# Patient Record
Sex: FEMALE | Race: WHITE | NOT HISPANIC OR LATINO | Employment: FULL TIME | URBAN - METROPOLITAN AREA
[De-identification: names, ages, dates, MRNs, and addresses within clinical notes are randomized per-mention and may not be internally consistent; named-entity substitution may affect disease eponyms.]

---

## 2017-01-10 ENCOUNTER — LAB REQUISITION (OUTPATIENT)
Dept: LAB | Facility: HOSPITAL | Age: 27
End: 2017-01-10

## 2017-01-10 DIAGNOSIS — J02.9 ACUTE PHARYNGITIS: ICD-10-CM

## 2017-01-10 LAB — S PYO AG THROAT QL: POSITIVE

## 2017-01-10 PROCEDURE — 87880 STREP A ASSAY W/OPTIC: CPT | Performed by: INTERNAL MEDICINE

## 2017-01-27 ENCOUNTER — LAB (OUTPATIENT)
Dept: LAB | Facility: HOSPITAL | Age: 27
End: 2017-01-27

## 2017-01-27 ENCOUNTER — TRANSCRIBE ORDERS (OUTPATIENT)
Dept: LAB | Facility: HOSPITAL | Age: 27
End: 2017-01-27

## 2017-01-27 DIAGNOSIS — Z01.818 PRE-OP EVALUATION: Primary | ICD-10-CM

## 2017-01-27 DIAGNOSIS — Z01.818 PRE-OP EVALUATION: ICD-10-CM

## 2017-01-27 LAB
APTT PPP: 32.1 SECONDS (ref 24–31)
BLEEDING TIME PATIENT: 7 MINUTES (ref 2–8)
DEPRECATED RDW RBC AUTO: 40.2 FL (ref 37–54)
ERYTHROCYTE [DISTWIDTH] IN BLOOD BY AUTOMATED COUNT: 12.6 % (ref 11.3–14.5)
HCT VFR BLD AUTO: 39.9 % (ref 34.5–44)
HGB BLD-MCNC: 13.6 G/DL (ref 11.5–15.5)
INR PPP: 0.96
MCH RBC QN AUTO: 30.2 PG (ref 27–31)
MCHC RBC AUTO-ENTMCNC: 34.1 G/DL (ref 32–36)
MCV RBC AUTO: 88.5 FL (ref 80–99)
PLATELET # BLD AUTO: 208 10*3/MM3 (ref 150–450)
PMV BLD AUTO: 13.1 FL (ref 6–12)
PROTHROMBIN TIME: 10.5 SECONDS (ref 9.6–11.5)
RBC # BLD AUTO: 4.51 10*6/MM3 (ref 3.89–5.14)
WBC NRBC COR # BLD: 8.1 10*3/MM3 (ref 3.5–10.8)

## 2017-01-27 PROCEDURE — 85610 PROTHROMBIN TIME: CPT | Performed by: OTOLARYNGOLOGY

## 2017-01-27 PROCEDURE — 85027 COMPLETE CBC AUTOMATED: CPT | Performed by: OTOLARYNGOLOGY

## 2017-01-27 PROCEDURE — 36415 COLL VENOUS BLD VENIPUNCTURE: CPT | Performed by: OTOLARYNGOLOGY

## 2017-01-27 PROCEDURE — 85002 BLEEDING TIME TEST: CPT | Performed by: OTOLARYNGOLOGY

## 2017-01-27 PROCEDURE — 85730 THROMBOPLASTIN TIME PARTIAL: CPT | Performed by: OTOLARYNGOLOGY

## 2017-03-16 ENCOUNTER — LAB REQUISITION (OUTPATIENT)
Dept: LAB | Facility: HOSPITAL | Age: 27
End: 2017-03-16

## 2017-03-16 DIAGNOSIS — J35.01 CHRONIC TONSILLITIS: ICD-10-CM

## 2017-03-16 PROCEDURE — 88304 TISSUE EXAM BY PATHOLOGIST: CPT | Performed by: OTOLARYNGOLOGY

## 2017-03-17 LAB
CYTO UR: NORMAL
LAB AP CASE REPORT: NORMAL
LAB AP CLINICAL INFORMATION: NORMAL
Lab: NORMAL
PATH REPORT.FINAL DX SPEC: NORMAL
PATH REPORT.GROSS SPEC: NORMAL

## 2017-03-23 ENCOUNTER — HOSPITAL ENCOUNTER (EMERGENCY)
Facility: HOSPITAL | Age: 27
Discharge: HOME | End: 2017-03-24
Attending: EMERGENCY MEDICINE | Admitting: EMERGENCY MEDICINE

## 2017-03-23 DIAGNOSIS — T17.208A FOREIGN BODY IN THROAT, INITIAL ENCOUNTER: Primary | ICD-10-CM

## 2017-03-23 PROCEDURE — 99283 EMERGENCY DEPT VISIT LOW MDM: CPT

## 2017-03-24 VITALS
TEMPERATURE: 98.3 F | RESPIRATION RATE: 22 BRPM | DIASTOLIC BLOOD PRESSURE: 77 MMHG | HEIGHT: 63 IN | HEART RATE: 105 BPM | SYSTOLIC BLOOD PRESSURE: 110 MMHG | BODY MASS INDEX: 31.01 KG/M2 | OXYGEN SATURATION: 98 % | WEIGHT: 175 LBS

## 2017-03-24 NOTE — ED PROVIDER NOTES
Subjective   HPI Comments: Mrs. Ospina is a 26 y.o female who presents to the ED c/o a tablet stuck in her throat. starting at 2230 today. She reports that she has an ibuprofen tablet stuck in her throat when trying to swallow it. She initially reports of multiple episodes of nausea and vomiting right after the tablet got stuck and that has not caused her to spit the tablet back up. She denies any SOA, or any other acute sx at this time.     She had a tonsillectomy one week ago.     Patient is a 26 y.o. female presenting with foreign body.   History provided by:  Patient  Foreign Body   Location:  Swallowed  Suspected object: Ibuprofen tablet.  Pain severity:  No pain  Duration: Onset 2230 today.  Timing:  Constant  Progression:  Unchanged  Chronicity:  New  Worsened by:  Nothing  Ineffective treatments:  None tried  Associated symptoms: nausea, trouble swallowing and vomiting        Review of Systems   HENT: Positive for trouble swallowing.    Respiratory: Negative for shortness of breath.    Gastrointestinal: Positive for nausea and vomiting.   All other systems reviewed and are negative.      No past medical history on file.    Allergies   Allergen Reactions   • Keflex [Cephalexin]    • Prednisone    • Procardia [Nifedipine]    • Sulfa Antibiotics        No past surgical history on file.    No family history on file.    Social History     Social History   • Marital status: Single     Spouse name: N/A   • Number of children: N/A   • Years of education: N/A     Social History Main Topics   • Smoking status: Not on file   • Smokeless tobacco: Not on file   • Alcohol use Not on file   • Drug use: Not on file   • Sexual activity: Not on file     Other Topics Concern   • Not on file     Social History Narrative         Objective   Physical Exam   Constitutional: She is oriented to person, place, and time. She appears well-developed and well-nourished. No distress.   HENT:   Head: Normocephalic and atraumatic.  "  Mouth/Throat: Oropharynx is clear and moist.   Healing post operative tonsillectomy site shows no signs of infectin. Granulation tissue present. I cannot visualize the FB with both tongue blade and dental mirroe. I am able to see the epiglottis clearly and see no pill or pill gragments.    Eyes: Conjunctivae are normal.   Neck: Normal range of motion. Neck supple.   Cardiovascular: Normal rate, regular rhythm, normal heart sounds and intact distal pulses.    Pulmonary/Chest: Effort normal and breath sounds normal. No respiratory distress.   Abdominal: Soft. There is no tenderness.   Musculoskeletal: Normal range of motion.   Neurological: She is alert and oriented to person, place, and time. She has normal strength.   Skin: Skin is warm and dry.   Psychiatric: She has a normal mood and affect. Her behavior is normal.   Nursing note and vitals reviewed.      Procedures         ED Course  ED Course   Comment By Time   I had patient swallow saltine crackers and drink fluids in the emergency department.  This led to the pill being swallowed.  Patient is now a symptomatically. Ji Ware MD 03/24 0054     No results found for this or any previous visit (from the past 24 hour(s)).  Note: In addition to lab results from this visit, the labs listed above may include labs taken at another facility or during a different encounter within the last 24 hours. Please correlate lab times with ED admission and discharge times for further clarification of the services performed during this visit.    No orders to display     Vitals:    03/23/17 2322   BP: 115/73   BP Location: Left arm   Patient Position: Sitting   Pulse: 105   Resp: 22   Temp: 98.3 °F (36.8 °C)   TempSrc: Oral   SpO2: 99%   Weight: 175 lb (79.4 kg)   Height: 63\" (160 cm)     Medications - No data to display  ECG/EMG Results (last 24 hours)     ** No results found for the last 24 hours. **                          MDM    Final diagnoses:   Foreign body in " throat, initial encounter       Documentation assistance provided by arnold ALVARADO.  Information recorded by the barbaraibrachana was done at my direction and has been verified and validated by me.     Ángela Alvarado  03/23/17 2353       Ángela Alvarado  03/24/17 0003       Ji Ware MD  03/24/17 6265

## 2017-05-18 ENCOUNTER — APPOINTMENT (OUTPATIENT)
Dept: ULTRASOUND IMAGING | Facility: HOSPITAL | Age: 27
End: 2017-05-18

## 2017-05-18 ENCOUNTER — HOSPITAL ENCOUNTER (OUTPATIENT)
Dept: ULTRASOUND IMAGING | Facility: HOSPITAL | Age: 27
Discharge: HOME | End: 2017-05-18
Admitting: NURSE PRACTITIONER

## 2017-05-18 ENCOUNTER — TRANSCRIBE ORDERS (OUTPATIENT)
Dept: ADMINISTRATIVE | Facility: HOSPITAL | Age: 27
End: 2017-05-18

## 2017-05-18 DIAGNOSIS — T83.84XA PAIN DUE TO INTRAUTERINE CONTRACEPTIVE DEVICE (IUD), INITIAL ENCOUNTER (HCC): ICD-10-CM

## 2017-05-18 DIAGNOSIS — T83.84XA PAIN DUE TO INTRAUTERINE CONTRACEPTIVE DEVICE (IUD), INITIAL ENCOUNTER (HCC): Primary | ICD-10-CM

## 2017-05-18 PROCEDURE — 76830 TRANSVAGINAL US NON-OB: CPT

## 2017-05-19 ENCOUNTER — TRANSCRIBE ORDERS (OUTPATIENT)
Dept: NUTRITION | Facility: HOSPITAL | Age: 27
End: 2017-05-19

## 2017-05-19 DIAGNOSIS — E66.9 OBESITY, UNSPECIFIED: Primary | ICD-10-CM

## 2017-05-30 ENCOUNTER — LAB REQUISITION (OUTPATIENT)
Dept: LAB | Facility: HOSPITAL | Age: 27
End: 2017-05-30

## 2017-05-30 DIAGNOSIS — L03.115 CELLULITIS OF RIGHT LOWER EXTREMITY: ICD-10-CM

## 2017-05-30 PROCEDURE — 87147 CULTURE TYPE IMMUNOLOGIC: CPT | Performed by: INTERNAL MEDICINE

## 2017-05-30 PROCEDURE — 87077 CULTURE AEROBIC IDENTIFY: CPT | Performed by: INTERNAL MEDICINE

## 2017-05-30 PROCEDURE — 87070 CULTURE OTHR SPECIMN AEROBIC: CPT | Performed by: INTERNAL MEDICINE

## 2017-05-30 PROCEDURE — 87205 SMEAR GRAM STAIN: CPT | Performed by: INTERNAL MEDICINE

## 2017-05-30 PROCEDURE — 87186 SC STD MICRODIL/AGAR DIL: CPT | Performed by: INTERNAL MEDICINE

## 2017-05-31 ENCOUNTER — LAB (OUTPATIENT)
Dept: LAB | Facility: HOSPITAL | Age: 27
End: 2017-05-31

## 2017-05-31 ENCOUNTER — TRANSCRIBE ORDERS (OUTPATIENT)
Dept: LAB | Facility: HOSPITAL | Age: 27
End: 2017-05-31

## 2017-05-31 DIAGNOSIS — L03.115 CELLULITIS OF RIGHT FOOT: Primary | ICD-10-CM

## 2017-05-31 DIAGNOSIS — L03.115 CELLULITIS OF RIGHT FOOT: ICD-10-CM

## 2017-05-31 DIAGNOSIS — L02.415 ABSCESS OF RIGHT HIP: ICD-10-CM

## 2017-05-31 DIAGNOSIS — B95.62 CELLULITIS DUE TO MRSA: ICD-10-CM

## 2017-05-31 DIAGNOSIS — L03.90 CELLULITIS DUE TO MRSA: ICD-10-CM

## 2017-05-31 LAB
ALBUMIN SERPL-MCNC: 4.3 G/DL (ref 3.2–4.8)
ALBUMIN/GLOB SERPL: 1.9 G/DL (ref 1.5–2.5)
ALP SERPL-CCNC: 51 U/L (ref 25–100)
ALT SERPL W P-5'-P-CCNC: 22 U/L (ref 7–40)
ANION GAP SERPL CALCULATED.3IONS-SCNC: 4 MMOL/L (ref 3–11)
AST SERPL-CCNC: 18 U/L (ref 0–33)
BASOPHILS # BLD AUTO: 0.03 10*3/MM3 (ref 0–0.2)
BASOPHILS NFR BLD AUTO: 0.3 % (ref 0–1)
BILIRUB SERPL-MCNC: 1.1 MG/DL (ref 0.3–1.2)
BUN BLD-MCNC: 10 MG/DL (ref 9–23)
BUN/CREAT SERPL: 12.5 (ref 7–25)
CALCIUM SPEC-SCNC: 9.8 MG/DL (ref 8.7–10.4)
CHLORIDE SERPL-SCNC: 104 MMOL/L (ref 99–109)
CK SERPL-CCNC: 236 U/L (ref 26–174)
CO2 SERPL-SCNC: 33 MMOL/L (ref 20–31)
CREAT BLD-MCNC: 0.8 MG/DL (ref 0.6–1.3)
CRP SERPL-MCNC: 4.15 MG/DL (ref 0–1)
DEPRECATED RDW RBC AUTO: 45.8 FL (ref 37–54)
EOSINOPHIL # BLD AUTO: 0.18 10*3/MM3 (ref 0.1–0.3)
EOSINOPHIL NFR BLD AUTO: 2.1 % (ref 0–3)
ERYTHROCYTE [DISTWIDTH] IN BLOOD BY AUTOMATED COUNT: 13.6 % (ref 11.3–14.5)
ERYTHROCYTE [SEDIMENTATION RATE] IN BLOOD: 6 MM/HR (ref 0–20)
GFR SERPL CREATININE-BSD FRML MDRD: 87 ML/MIN/1.73
GLOBULIN UR ELPH-MCNC: 2.3 GM/DL
GLUCOSE BLD-MCNC: 82 MG/DL (ref 70–100)
HCT VFR BLD AUTO: 42.1 % (ref 34.5–44)
HGB BLD-MCNC: 13.7 G/DL (ref 11.5–15.5)
IMM GRANULOCYTES # BLD: 0.01 10*3/MM3 (ref 0–0.03)
IMM GRANULOCYTES NFR BLD: 0.1 % (ref 0–0.6)
LYMPHOCYTES # BLD AUTO: 1.99 10*3/MM3 (ref 0.6–4.8)
LYMPHOCYTES NFR BLD AUTO: 23 % (ref 24–44)
MCH RBC QN AUTO: 30.2 PG (ref 27–31)
MCHC RBC AUTO-ENTMCNC: 32.5 G/DL (ref 32–36)
MCV RBC AUTO: 92.7 FL (ref 80–99)
MONOCYTES # BLD AUTO: 0.83 10*3/MM3 (ref 0–1)
MONOCYTES NFR BLD AUTO: 9.6 % (ref 0–12)
NEUTROPHILS # BLD AUTO: 5.6 10*3/MM3 (ref 1.5–8.3)
NEUTROPHILS NFR BLD AUTO: 64.9 % (ref 41–71)
PLATELET # BLD AUTO: 169 10*3/MM3 (ref 150–450)
PMV BLD AUTO: 12.9 FL (ref 6–12)
POTASSIUM BLD-SCNC: 4.4 MMOL/L (ref 3.5–5.5)
PROT SERPL-MCNC: 6.6 G/DL (ref 5.7–8.2)
RBC # BLD AUTO: 4.54 10*6/MM3 (ref 3.89–5.14)
SODIUM BLD-SCNC: 141 MMOL/L (ref 132–146)
WBC NRBC COR # BLD: 8.64 10*3/MM3 (ref 3.5–10.8)

## 2017-05-31 PROCEDURE — 86140 C-REACTIVE PROTEIN: CPT | Performed by: INTERNAL MEDICINE

## 2017-05-31 PROCEDURE — 85652 RBC SED RATE AUTOMATED: CPT | Performed by: INTERNAL MEDICINE

## 2017-05-31 PROCEDURE — 80053 COMPREHEN METABOLIC PANEL: CPT | Performed by: INTERNAL MEDICINE

## 2017-05-31 PROCEDURE — 36415 COLL VENOUS BLD VENIPUNCTURE: CPT

## 2017-05-31 PROCEDURE — 82550 ASSAY OF CK (CPK): CPT | Performed by: INTERNAL MEDICINE

## 2017-05-31 PROCEDURE — 85025 COMPLETE CBC W/AUTO DIFF WBC: CPT | Performed by: INTERNAL MEDICINE

## 2017-06-02 ENCOUNTER — APPOINTMENT (OUTPATIENT)
Dept: NUTRITION | Facility: HOSPITAL | Age: 27
End: 2017-06-02

## 2017-06-02 LAB
BACTERIA SPEC AEROBE CULT: ABNORMAL
GRAM STN SPEC: ABNORMAL

## 2017-06-15 ENCOUNTER — APPOINTMENT (OUTPATIENT)
Dept: GENERAL RADIOLOGY | Facility: HOSPITAL | Age: 27
End: 2017-06-15

## 2017-06-15 ENCOUNTER — HOSPITAL ENCOUNTER (EMERGENCY)
Facility: HOSPITAL | Age: 27
Discharge: HOME | End: 2017-06-15
Attending: EMERGENCY MEDICINE | Admitting: EMERGENCY MEDICINE

## 2017-06-15 VITALS
BODY MASS INDEX: 31.54 KG/M2 | WEIGHT: 178 LBS | DIASTOLIC BLOOD PRESSURE: 72 MMHG | OXYGEN SATURATION: 100 % | SYSTOLIC BLOOD PRESSURE: 126 MMHG | TEMPERATURE: 97.9 F | RESPIRATION RATE: 16 BRPM | HEIGHT: 63 IN | HEART RATE: 78 BPM

## 2017-06-15 DIAGNOSIS — S80.02XA CONTUSION OF LEFT KNEE, INITIAL ENCOUNTER: Primary | ICD-10-CM

## 2017-06-15 DIAGNOSIS — S80.01XA CONTUSION OF RIGHT KNEE, INITIAL ENCOUNTER: ICD-10-CM

## 2017-06-15 PROCEDURE — 73560 X-RAY EXAM OF KNEE 1 OR 2: CPT

## 2017-06-15 PROCEDURE — 99282 EMERGENCY DEPT VISIT SF MDM: CPT

## 2017-06-15 RX ORDER — NAPROXEN 500 MG/1
500 TABLET ORAL 2 TIMES DAILY PRN
Qty: 20 TABLET | Refills: 0 | Status: SHIPPED | OUTPATIENT
Start: 2017-06-15 | End: 2018-02-21
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
## 2017-08-09 ENCOUNTER — OFFICE VISIT (OUTPATIENT)
Dept: OBSTETRICS AND GYNECOLOGY | Facility: CLINIC | Age: 27
End: 2017-08-09

## 2017-08-09 VITALS
DIASTOLIC BLOOD PRESSURE: 78 MMHG | WEIGHT: 182 LBS | TEMPERATURE: 98.2 F | SYSTOLIC BLOOD PRESSURE: 120 MMHG | BODY MASS INDEX: 32.25 KG/M2 | HEIGHT: 63 IN

## 2017-08-09 DIAGNOSIS — L68.0 FEMALE HIRSUTISM: Primary | ICD-10-CM

## 2017-08-09 DIAGNOSIS — Z30.431 IUD CHECK UP: ICD-10-CM

## 2017-08-09 DIAGNOSIS — R45.86 MOOD SWINGS: ICD-10-CM

## 2017-08-09 PROCEDURE — 99213 OFFICE O/P EST LOW 20 MIN: CPT | Performed by: OBSTETRICS & GYNECOLOGY

## 2017-08-09 RX ORDER — NORETHINDRONE ACETATE AND ETHINYL ESTRADIOL, AND FERROUS FUMARATE 1MG-20(24)
1 KIT ORAL DAILY
Qty: 28 CAPSULE | Refills: 12 | Status: SHIPPED | OUTPATIENT
Start: 2017-08-09 | End: 2017-09-06

## 2017-08-09 NOTE — PROGRESS NOTES
Subjective   Dulce Opsina is a 26 y.o. female.     History of Present Illness  Patient is using Mirena for birth control.  She is experiencing hirsutism, irregular bleeding, and mood swings, depression.  Patient had a transvaginal ultrasound in May which showed the IUD in proper position and small ovarian cyst.  Patient wants workup and treatment for the pre-mentioned symptoms.  The following portions of the patient's history were reviewed and updated as appropriate: allergies, current medications, past family history, past medical history, past social history, past surgical history and problem list.    Review of Systems   Constitutional: Negative.    Respiratory: Negative.    Cardiovascular: Negative.    Gastrointestinal: Positive for diarrhea. Negative for abdominal distention, abdominal pain, anal bleeding, blood in stool, constipation, nausea, rectal pain and vomiting.   Genitourinary: Positive for menstrual problem, pelvic pain and vaginal bleeding. Negative for decreased urine volume, difficulty urinating, dyspareunia, dysuria, enuresis, flank pain, frequency, genital sores, hematuria, urgency, vaginal discharge and vaginal pain.   Psychiatric/Behavioral:        Depression       Objective   Physical Exam   Constitutional: She appears well-developed and well-nourished.   Genitourinary: Vagina normal. Pelvic exam was performed with patient supine. There is no rash, tenderness, lesion or injury on the right labia. There is no rash, tenderness, lesion or injury on the left labia. Uterus is not deviated, not enlarged, not fixed and not tender. Cervix exhibits no motion tenderness, no discharge and no friability. Right adnexum displays no mass, no tenderness and no fullness. Left adnexum displays no mass, no tenderness and no fullness.       Nursing note and vitals reviewed.      Assessment/Plan   Dulce was seen today for follow-up.    Diagnoses and all orders for this visit:    Female hirsutism    IUD check  up    Mood swings    Other orders  -     Cancel: Liquid-based Pap Smear, Screening     Will add birth control pills for HRT replacement and  ovarian suppression 3 months  Return 3 months    Abiodun Martinez MD

## 2017-11-18 ENCOUNTER — HOSPITAL ENCOUNTER (EMERGENCY)
Facility: HOSPITAL | Age: 27
Discharge: HOME | End: 2017-11-18
Attending: EMERGENCY MEDICINE | Admitting: EMERGENCY MEDICINE

## 2017-11-18 VITALS
HEIGHT: 63 IN | TEMPERATURE: 98.4 F | HEART RATE: 83 BPM | WEIGHT: 183 LBS | OXYGEN SATURATION: 99 % | SYSTOLIC BLOOD PRESSURE: 130 MMHG | DIASTOLIC BLOOD PRESSURE: 81 MMHG | BODY MASS INDEX: 32.43 KG/M2 | RESPIRATION RATE: 18 BRPM

## 2017-11-18 DIAGNOSIS — Z20.2 POSSIBLE EXPOSURE TO STD: Primary | ICD-10-CM

## 2017-11-18 LAB
ALBUMIN SERPL-MCNC: 4.4 G/DL (ref 3.2–4.8)
ALBUMIN/GLOB SERPL: 1.6 G/DL (ref 1.5–2.5)
ALP SERPL-CCNC: 50 U/L (ref 25–100)
ALT SERPL W P-5'-P-CCNC: 32 U/L (ref 7–40)
ANION GAP SERPL CALCULATED.3IONS-SCNC: 13 MMOL/L (ref 3–11)
AST SERPL-CCNC: 25 U/L (ref 0–33)
BASOPHILS # BLD AUTO: 0.04 10*3/MM3 (ref 0–0.2)
BASOPHILS NFR BLD AUTO: 0.4 % (ref 0–1)
BILIRUB SERPL-MCNC: 0.6 MG/DL (ref 0.3–1.2)
BUN BLD-MCNC: 9 MG/DL (ref 9–23)
BUN/CREAT SERPL: 11.3 (ref 7–25)
CALCIUM SPEC-SCNC: 8.9 MG/DL (ref 8.7–10.4)
CHLORIDE SERPL-SCNC: 103 MMOL/L (ref 99–109)
CLUE CELLS SPEC QL WET PREP: ABNORMAL
CO2 SERPL-SCNC: 25 MMOL/L (ref 20–31)
CREAT BLD-MCNC: 0.8 MG/DL (ref 0.6–1.3)
DEPRECATED RDW RBC AUTO: 42.9 FL (ref 37–54)
EOSINOPHIL # BLD AUTO: 0.09 10*3/MM3 (ref 0–0.3)
EOSINOPHIL NFR BLD AUTO: 0.9 % (ref 0–3)
ERYTHROCYTE [DISTWIDTH] IN BLOOD BY AUTOMATED COUNT: 12.8 % (ref 11.3–14.5)
GFR SERPL CREATININE-BSD FRML MDRD: 87 ML/MIN/1.73
GLOBULIN UR ELPH-MCNC: 2.7 GM/DL
GLUCOSE BLD-MCNC: 91 MG/DL (ref 70–100)
HCT VFR BLD AUTO: 43.4 % (ref 34.5–44)
HGB BLD-MCNC: 15.2 G/DL (ref 11.5–15.5)
HIV1+2 AB SER QL: NORMAL
HYDATID CYST SPEC WET PREP: ABNORMAL
IMM GRANULOCYTES # BLD: 0.02 10*3/MM3 (ref 0–0.03)
IMM GRANULOCYTES NFR BLD: 0.2 % (ref 0–0.6)
KOH PREP NAIL: NORMAL
LYMPHOCYTES # BLD AUTO: 1.67 10*3/MM3 (ref 0.6–4.8)
LYMPHOCYTES NFR BLD AUTO: 17.2 % (ref 24–44)
MCH RBC QN AUTO: 32.1 PG (ref 27–31)
MCHC RBC AUTO-ENTMCNC: 35 G/DL (ref 32–36)
MCV RBC AUTO: 91.6 FL (ref 80–99)
MONOCYTES # BLD AUTO: 0.88 10*3/MM3 (ref 0–1)
MONOCYTES NFR BLD AUTO: 9.1 % (ref 0–12)
NEUTROPHILS # BLD AUTO: 6.99 10*3/MM3 (ref 1.5–8.3)
NEUTROPHILS NFR BLD AUTO: 72.2 % (ref 41–71)
PLATELET # BLD AUTO: 220 10*3/MM3 (ref 150–450)
PMV BLD AUTO: 12.8 FL (ref 6–12)
POTASSIUM BLD-SCNC: 3.7 MMOL/L (ref 3.5–5.5)
PROT SERPL-MCNC: 7.1 G/DL (ref 5.7–8.2)
RBC # BLD AUTO: 4.74 10*6/MM3 (ref 3.89–5.14)
SODIUM BLD-SCNC: 141 MMOL/L (ref 132–146)
T VAGINALIS SPEC QL WET PREP: ABNORMAL
WBC NRBC COR # BLD: 9.69 10*3/MM3 (ref 3.5–10.8)
WBC SPEC QL WET PREP: ABNORMAL
YEAST GENITAL QL WET PREP: ABNORMAL

## 2017-11-18 PROCEDURE — 87220 TISSUE EXAM FOR FUNGI: CPT | Performed by: PHYSICIAN ASSISTANT

## 2017-11-18 PROCEDURE — 85025 COMPLETE CBC W/AUTO DIFF WBC: CPT | Performed by: PHYSICIAN ASSISTANT

## 2017-11-18 PROCEDURE — 87210 SMEAR WET MOUNT SALINE/INK: CPT | Performed by: PHYSICIAN ASSISTANT

## 2017-11-18 PROCEDURE — G0432 EIA HIV-1/HIV-2 SCREEN: HCPCS | Performed by: PHYSICIAN ASSISTANT

## 2017-11-18 PROCEDURE — 87491 CHLMYD TRACH DNA AMP PROBE: CPT | Performed by: PHYSICIAN ASSISTANT

## 2017-11-18 PROCEDURE — 99283 EMERGENCY DEPT VISIT LOW MDM: CPT

## 2017-11-18 PROCEDURE — 87591 N.GONORRHOEAE DNA AMP PROB: CPT | Performed by: PHYSICIAN ASSISTANT

## 2017-11-18 PROCEDURE — 80053 COMPREHEN METABOLIC PANEL: CPT | Performed by: PHYSICIAN ASSISTANT

## 2017-11-18 RX ORDER — ONDANSETRON 4 MG/1
4 TABLET, ORALLY DISINTEGRATING ORAL ONCE
Status: COMPLETED | OUTPATIENT
Start: 2017-11-18 | End: 2017-11-18

## 2017-11-18 RX ORDER — EMTRICITABINE AND TENOFOVIR DISOPROXIL FUMARATE 200; 300 MG/1; MG/1
1 TABLET, FILM COATED ORAL
Status: DISCONTINUED | OUTPATIENT
Start: 2017-11-18 | End: 2017-11-18

## 2017-11-18 RX ORDER — METRONIDAZOLE 500 MG/1
500 TABLET ORAL 3 TIMES DAILY
Qty: 21 TABLET | Refills: 0 | OUTPATIENT
Start: 2017-11-18 | End: 2018-01-13

## 2017-11-18 RX ORDER — DIPHENOXYLATE HYDROCHLORIDE AND ATROPINE SULFATE 2.5; .025 MG/1; MG/1
1 TABLET ORAL 4 TIMES DAILY PRN
COMMUNITY
End: 2018-02-21

## 2017-11-18 RX ORDER — AZITHROMYCIN 250 MG/1
2000 TABLET, FILM COATED ORAL ONCE
Status: COMPLETED | OUTPATIENT
Start: 2017-11-18 | End: 2017-11-18

## 2017-11-18 RX ORDER — EMTRICITABINE AND TENOFOVIR DISOPROXIL FUMARATE 200; 300 MG/1; MG/1
1 TABLET, FILM COATED ORAL DAILY
Qty: 30 TABLET | Refills: 0 | Status: SHIPPED | OUTPATIENT
Start: 2017-11-18 | End: 2018-02-21

## 2017-11-18 RX ORDER — ONDANSETRON 4 MG/1
4 TABLET, FILM COATED ORAL EVERY 8 HOURS PRN
Qty: 20 TABLET | Refills: 0 | OUTPATIENT
Start: 2017-11-18 | End: 2018-02-24

## 2017-11-18 RX ADMIN — AZITHROMYCIN 2000 MG: 250 TABLET, FILM COATED ORAL at 17:06

## 2017-11-18 RX ADMIN — ONDANSETRON 4 MG: 4 TABLET, ORALLY DISINTEGRATING ORAL at 17:06

## 2017-11-18 NOTE — DISCHARGE INSTRUCTIONS
Follow-up with your primary care provider for repeat CBC and CMP in 1 week.  Return to the emergency department if any change or worsening of symptoms.
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
## 2017-11-22 LAB
C TRACH RRNA SPEC DONR QL NAA+PROBE: NEGATIVE
N GONORRHOEA DNA SPEC QL NAA+PROBE: NEGATIVE

## 2017-12-12 ENCOUNTER — HOSPITAL ENCOUNTER (EMERGENCY)
Facility: HOSPITAL | Age: 27
Discharge: HOME | End: 2017-12-13
Attending: EMERGENCY MEDICINE | Admitting: EMERGENCY MEDICINE

## 2017-12-12 ENCOUNTER — APPOINTMENT (OUTPATIENT)
Dept: ULTRASOUND IMAGING | Facility: HOSPITAL | Age: 27
End: 2017-12-12

## 2017-12-12 VITALS
HEART RATE: 92 BPM | RESPIRATION RATE: 16 BRPM | DIASTOLIC BLOOD PRESSURE: 79 MMHG | OXYGEN SATURATION: 99 % | SYSTOLIC BLOOD PRESSURE: 112 MMHG | HEIGHT: 63 IN | WEIGHT: 175 LBS | TEMPERATURE: 97.9 F | BODY MASS INDEX: 31.01 KG/M2

## 2017-12-12 DIAGNOSIS — N39.0 URINARY TRACT INFECTION IN FEMALE: Primary | ICD-10-CM

## 2017-12-12 DIAGNOSIS — B96.89 BACTERIAL VAGINOSIS: ICD-10-CM

## 2017-12-12 DIAGNOSIS — N76.0 BACTERIAL VAGINOSIS: ICD-10-CM

## 2017-12-12 LAB
ALBUMIN SERPL-MCNC: 4.2 G/DL (ref 3.2–4.8)
ALBUMIN/GLOB SERPL: 1.9 G/DL (ref 1.5–2.5)
ALP SERPL-CCNC: 39 U/L (ref 25–100)
ALT SERPL W P-5'-P-CCNC: 28 U/L (ref 7–40)
ANION GAP SERPL CALCULATED.3IONS-SCNC: 5 MMOL/L (ref 3–11)
AST SERPL-CCNC: 25 U/L (ref 0–33)
B-HCG UR QL: NEGATIVE
BACTERIA UR QL AUTO: ABNORMAL /HPF
BASOPHILS # BLD AUTO: 0.03 10*3/MM3 (ref 0–0.2)
BASOPHILS NFR BLD AUTO: 0.5 % (ref 0–1)
BILIRUB SERPL-MCNC: 1 MG/DL (ref 0.3–1.2)
BILIRUB UR QL STRIP: NEGATIVE
BUN BLD-MCNC: 7 MG/DL (ref 9–23)
BUN/CREAT SERPL: 8.8 (ref 7–25)
CALCIUM SPEC-SCNC: 9.2 MG/DL (ref 8.7–10.4)
CHLORIDE SERPL-SCNC: 105 MMOL/L (ref 99–109)
CLARITY UR: ABNORMAL
CLUE CELLS SPEC QL WET PREP: ABNORMAL
CO2 SERPL-SCNC: 26 MMOL/L (ref 20–31)
COLOR UR: YELLOW
CREAT BLD-MCNC: 0.8 MG/DL (ref 0.6–1.3)
DEPRECATED RDW RBC AUTO: 43.4 FL (ref 37–54)
EOSINOPHIL # BLD AUTO: 0.17 10*3/MM3 (ref 0–0.3)
EOSINOPHIL NFR BLD AUTO: 2.9 % (ref 0–3)
ERYTHROCYTE [DISTWIDTH] IN BLOOD BY AUTOMATED COUNT: 13.1 % (ref 11.3–14.5)
GFR SERPL CREATININE-BSD FRML MDRD: 87 ML/MIN/1.73
GLOBULIN UR ELPH-MCNC: 2.2 GM/DL
GLUCOSE BLD-MCNC: 79 MG/DL (ref 70–100)
GLUCOSE UR STRIP-MCNC: NEGATIVE MG/DL
HCT VFR BLD AUTO: 40.9 % (ref 34.5–44)
HGB BLD-MCNC: 13.9 G/DL (ref 11.5–15.5)
HGB UR QL STRIP.AUTO: NEGATIVE
HYALINE CASTS UR QL AUTO: ABNORMAL /LPF
HYDATID CYST SPEC WET PREP: ABNORMAL
IMM GRANULOCYTES # BLD: 0.01 10*3/MM3 (ref 0–0.03)
IMM GRANULOCYTES NFR BLD: 0.2 % (ref 0–0.6)
INTERNAL NEGATIVE CONTROL: NEGATIVE
INTERNAL POSITIVE CONTROL: POSITIVE
KETONES UR QL STRIP: ABNORMAL
KOH PREP NAIL: NORMAL
LEUKOCYTE ESTERASE UR QL STRIP.AUTO: ABNORMAL
LIPASE SERPL-CCNC: 22 U/L (ref 6–51)
LYMPHOCYTES # BLD AUTO: 2.33 10*3/MM3 (ref 0.6–4.8)
LYMPHOCYTES NFR BLD AUTO: 39.4 % (ref 24–44)
Lab: NORMAL
MCH RBC QN AUTO: 31.1 PG (ref 27–31)
MCHC RBC AUTO-ENTMCNC: 34 G/DL (ref 32–36)
MCV RBC AUTO: 91.5 FL (ref 80–99)
MONOCYTES # BLD AUTO: 0.55 10*3/MM3 (ref 0–1)
MONOCYTES NFR BLD AUTO: 9.3 % (ref 0–12)
NEUTROPHILS # BLD AUTO: 2.82 10*3/MM3 (ref 1.5–8.3)
NEUTROPHILS NFR BLD AUTO: 47.7 % (ref 41–71)
NITRITE UR QL STRIP: POSITIVE
PH UR STRIP.AUTO: 7 [PH] (ref 5–8)
PLATELET # BLD AUTO: 166 10*3/MM3 (ref 150–450)
PMV BLD AUTO: 13.2 FL (ref 6–12)
POTASSIUM BLD-SCNC: 3.5 MMOL/L (ref 3.5–5.5)
PROT SERPL-MCNC: 6.4 G/DL (ref 5.7–8.2)
PROT UR QL STRIP: NEGATIVE
RBC # BLD AUTO: 4.47 10*6/MM3 (ref 3.89–5.14)
RBC # UR: ABNORMAL /HPF
REF LAB TEST METHOD: ABNORMAL
SODIUM BLD-SCNC: 136 MMOL/L (ref 132–146)
SP GR UR STRIP: 1.01 (ref 1–1.03)
SQUAMOUS #/AREA URNS HPF: ABNORMAL /HPF
T VAGINALIS SPEC QL WET PREP: ABNORMAL
UROBILINOGEN UR QL STRIP: ABNORMAL
WBC NRBC COR # BLD: 5.91 10*3/MM3 (ref 3.5–10.8)
WBC SPEC QL WET PREP: ABNORMAL
WBC UR QL AUTO: ABNORMAL /HPF
YEAST GENITAL QL WET PREP: ABNORMAL

## 2017-12-12 PROCEDURE — 87220 TISSUE EXAM FOR FUNGI: CPT | Performed by: NURSE PRACTITIONER

## 2017-12-12 PROCEDURE — 87491 CHLMYD TRACH DNA AMP PROBE: CPT | Performed by: NURSE PRACTITIONER

## 2017-12-12 PROCEDURE — 80053 COMPREHEN METABOLIC PANEL: CPT | Performed by: NURSE PRACTITIONER

## 2017-12-12 PROCEDURE — 87186 SC STD MICRODIL/AGAR DIL: CPT | Performed by: NURSE PRACTITIONER

## 2017-12-12 PROCEDURE — 76830 TRANSVAGINAL US NON-OB: CPT

## 2017-12-12 PROCEDURE — 87591 N.GONORRHOEAE DNA AMP PROB: CPT | Performed by: NURSE PRACTITIONER

## 2017-12-12 PROCEDURE — 81001 URINALYSIS AUTO W/SCOPE: CPT | Performed by: NURSE PRACTITIONER

## 2017-12-12 PROCEDURE — 83690 ASSAY OF LIPASE: CPT | Performed by: NURSE PRACTITIONER

## 2017-12-12 PROCEDURE — 85025 COMPLETE CBC W/AUTO DIFF WBC: CPT | Performed by: NURSE PRACTITIONER

## 2017-12-12 PROCEDURE — 87086 URINE CULTURE/COLONY COUNT: CPT | Performed by: NURSE PRACTITIONER

## 2017-12-12 PROCEDURE — 99283 EMERGENCY DEPT VISIT LOW MDM: CPT

## 2017-12-12 PROCEDURE — 87210 SMEAR WET MOUNT SALINE/INK: CPT | Performed by: NURSE PRACTITIONER

## 2017-12-12 PROCEDURE — 87077 CULTURE AEROBIC IDENTIFY: CPT | Performed by: NURSE PRACTITIONER

## 2017-12-12 RX ORDER — METRONIDAZOLE 500 MG/1
500 TABLET ORAL 2 TIMES DAILY
Qty: 14 TABLET | Refills: 0 | Status: SHIPPED | OUTPATIENT
Start: 2017-12-12 | End: 2018-02-21

## 2017-12-12 RX ORDER — NITROFURANTOIN 25; 75 MG/1; MG/1
100 CAPSULE ORAL 2 TIMES DAILY
Qty: 20 CAPSULE | Refills: 0 | Status: SHIPPED | OUTPATIENT
Start: 2017-12-12 | End: 2018-02-21

## 2017-12-13 NOTE — ED PROVIDER NOTES
Subjective   HPI Comments: Dulce Ospina is a 27 y.o.female who presents to the ED with c/o abd pain with onset 6 days ago. She reports that she suddenly developed severe worsening abdominal cramping and lower back pain. She notes that she believes that she has a tampon lodged into her vagina. She states that this is a recurrent issue. She reports during her previous episode she developed foul smelling vaginal discharge similar to her current episode. She notes that she was unable to see her PCP this week which prompted her visit to the ED. She also complains of constipation but denies N/V, fever, chills, urinary sx, or any other complaints at this time. She notes that she taken truvada which is a new medication for her. She also notes that she is on a HCG diet. She reports that her last menstrual cycle was 2 weeks ago.    Patient is a 27 y.o. female presenting with abdominal pain.   History provided by:  Patient  Abdominal Pain   Pain location:  Generalized  Pain quality: cramping    Pain radiates to:  Does not radiate  Pain severity:  Moderate  Onset quality:  Sudden  Timing:  Constant  Progression:  Worsening  Chronicity:  Recurrent  Relieved by:  None tried  Worsened by:  Nothing  Ineffective treatments:  None tried  Associated symptoms: constipation and vaginal discharge    Associated symptoms: no chills, no dysuria, no fever, no hematuria, no nausea and no vomiting        Review of Systems   Constitutional: Negative for chills and fever.   Gastrointestinal: Positive for abdominal pain and constipation. Negative for nausea and vomiting.   Genitourinary: Positive for vaginal discharge. Negative for decreased urine volume, difficulty urinating, dysuria, frequency, hematuria and urgency.   Musculoskeletal: Positive for back pain.   All other systems reviewed and are negative.      Past Medical History:   Diagnosis Date   • IBS (irritable bowel syndrome)        Allergies   Allergen Reactions   • Keflex  [Cephalexin]    • Prednisone    • Procardia [Nifedipine]    • Sulfa Antibiotics        Past Surgical History:   Procedure Laterality Date   • TONSILLECTOMY         History reviewed. No pertinent family history.    Social History     Social History   • Marital status: Single     Spouse name: N/A   • Number of children: N/A   • Years of education: N/A     Social History Main Topics   • Smoking status: Never Smoker   • Smokeless tobacco: Never Used   • Alcohol use No   • Drug use: No   • Sexual activity: Defer     Other Topics Concern   • None     Social History Narrative   • None         Objective   Physical Exam   Constitutional: She is oriented to person, place, and time. She appears well-developed and well-nourished. No distress.   HENT:   Head: Normocephalic and atraumatic.   Eyes: EOM are normal. Pupils are equal, round, and reactive to light.   Neck: Normal range of motion.   Cardiovascular: Normal rate and regular rhythm.    Pulmonary/Chest: Effort normal and breath sounds normal. No respiratory distress.   Abdominal: Soft. Bowel sounds are normal. She exhibits no distension.   Genitourinary: Cervix exhibits discharge. Cervix exhibits no motion tenderness. Right adnexum displays tenderness. Right adnexum displays no mass and no fullness. Left adnexum displays tenderness. Left adnexum displays no mass and no fullness. No tenderness in the vagina. No foreign body in the vagina. Vaginal discharge found.   Genitourinary Comments: Chaperone at bedside   Musculoskeletal: Normal range of motion.   Neurological: She is alert and oriented to person, place, and time.   Skin: Skin is warm and dry.   Psychiatric: She has a normal mood and affect. Her behavior is normal.   Nursing note and vitals reviewed.      Procedures         ED Course  ED Course   Comment By Time   12/12  6930 Pt is advised of results at this time. Pt will be dc home.  Patient will be discharged home with a prescription for Macrobid as well as Flagyl.   Patient encouraged to follow up with primary care physician, GYN.  Patient to return to the hospital for any other concerns.  Patient agrees and verbalizes understanding. Amarilys Garcia, APRN 12/13 0034       Recent Results (from the past 24 hour(s))   Urinalysis With / Culture If Indicated - Urine, Clean Catch    Collection Time: 12/12/17  9:46 PM   Result Value Ref Range    Color, UA Yellow Yellow, Straw    Appearance, UA Cloudy (A) Clear    pH, UA 7.0 5.0 - 8.0    Specific Gravity, UA 1.014 1.001 - 1.030    Glucose, UA Negative Negative    Ketones, UA 40 mg/dL (2+) (A) Negative    Bilirubin, UA Negative Negative    Blood, UA Negative Negative    Protein, UA Negative Negative    Leuk Esterase, UA Small (1+) (A) Negative    Nitrite, UA Positive (A) Negative    Urobilinogen, UA 1.0 E.U./dL 0.2 - 1.0 E.U./dL   STEWART Prep - Swab, Vagina    Collection Time: 12/12/17  9:46 PM   Result Value Ref Range    KOH Prep No yeast or hyphal elements seen No yeast or hyphal elements seen   Wet Prep, Genital - Swab, Vagina    Collection Time: 12/12/17  9:46 PM   Result Value Ref Range    YEAST No yeast seen No yeast seen    HYPHAL ELEMENTS No Hyphal elements seen No Hyphal elements seen    WBC'S No WBC's seen No WBC's seen    Clue Cells, Wet Prep 1+ Clue cells seen (A) No Clue cells seen    Trichomonas, Wet Prep No Trichomonas seen No Trichomonas seen   Comprehensive Metabolic Panel    Collection Time: 12/12/17  9:46 PM   Result Value Ref Range    Glucose 79 70 - 100 mg/dL    BUN 7 (L) 9 - 23 mg/dL    Creatinine 0.80 0.60 - 1.30 mg/dL    Sodium 136 132 - 146 mmol/L    Potassium 3.5 3.5 - 5.5 mmol/L    Chloride 105 99 - 109 mmol/L    CO2 26.0 20.0 - 31.0 mmol/L    Calcium 9.2 8.7 - 10.4 mg/dL    Total Protein 6.4 5.7 - 8.2 g/dL    Albumin 4.20 3.20 - 4.80 g/dL    ALT (SGPT) 28 7 - 40 U/L    AST (SGOT) 25 0 - 33 U/L    Alkaline Phosphatase 39 25 - 100 U/L    Total Bilirubin 1.0 0.3 - 1.2 mg/dL    eGFR Non  Amer 87 >60  mL/min/1.73    Globulin 2.2 gm/dL    A/G Ratio 1.9 1.5 - 2.5 g/dL    BUN/Creatinine Ratio 8.8 7.0 - 25.0    Anion Gap 5.0 3.0 - 11.0 mmol/L   Lipase    Collection Time: 12/12/17  9:46 PM   Result Value Ref Range    Lipase 22 6 - 51 U/L   CBC Auto Differential    Collection Time: 12/12/17  9:46 PM   Result Value Ref Range    WBC 5.91 3.50 - 10.80 10*3/mm3    RBC 4.47 3.89 - 5.14 10*6/mm3    Hemoglobin 13.9 11.5 - 15.5 g/dL    Hematocrit 40.9 34.5 - 44.0 %    MCV 91.5 80.0 - 99.0 fL    MCH 31.1 (H) 27.0 - 31.0 pg    MCHC 34.0 32.0 - 36.0 g/dL    RDW 13.1 11.3 - 14.5 %    RDW-SD 43.4 37.0 - 54.0 fl    MPV 13.2 (H) 6.0 - 12.0 fL    Platelets 166 150 - 450 10*3/mm3    Neutrophil % 47.7 41.0 - 71.0 %    Lymphocyte % 39.4 24.0 - 44.0 %    Monocyte % 9.3 0.0 - 12.0 %    Eosinophil % 2.9 0.0 - 3.0 %    Basophil % 0.5 0.0 - 1.0 %    Immature Grans % 0.2 0.0 - 0.6 %    Neutrophils, Absolute 2.82 1.50 - 8.30 10*3/mm3    Lymphocytes, Absolute 2.33 0.60 - 4.80 10*3/mm3    Monocytes, Absolute 0.55 0.00 - 1.00 10*3/mm3    Eosinophils, Absolute 0.17 0.00 - 0.30 10*3/mm3    Basophils, Absolute 0.03 0.00 - 0.20 10*3/mm3    Immature Grans, Absolute 0.01 0.00 - 0.03 10*3/mm3   Urinalysis, Microscopic Only - Urine, Clean Catch    Collection Time: 12/12/17  9:46 PM   Result Value Ref Range    RBC, UA 0-2 None Seen, 0-2 /HPF    WBC, UA 31-50 (A) None Seen /HPF    Bacteria, UA 4+ (A) None Seen, Trace /HPF    Squamous Epithelial Cells, UA 0-2 None Seen, 0-2 /HPF    Hyaline Casts, UA 0-6 0 - 6 /LPF    Methodology Automated Microscopy    POCT Pregnancy, Urine    Collection Time: 12/12/17  9:57 PM   Result Value Ref Range    HCG, Urine, QL Negative Negative    Lot Number [id number]     Internal Positive Control Positive     Internal Negative Control Negative      Note: In addition to lab results from this visit, the labs listed above may include labs taken at another facility or during a different encounter within the last 24 hours. Please  "correlate lab times with ED admission and discharge times for further clarification of the services performed during this visit.    US Non-ob Transvaginal   Final Result     Normal pelvic sonogram.          Normal appearing intrauterine IUD within the endometrial canal.       THIS DOCUMENT HAS BEEN ELECTRONICALLY SIGNED BY MEY CABRAL MD        Vitals:    12/12/17 1825 12/12/17 1830   BP: 122/81 112/79   BP Location: Right arm    Patient Position: Sitting    Pulse: 92    Resp: 16    Temp: 97.9 °F (36.6 °C)    TempSrc: Oral    SpO2: 98% 99%   Weight: 79.4 kg (175 lb)    Height: 160 cm (62.99\")      Medications - No data to display  ECG/EMG Results (last 24 hours)     ** No results found for the last 24 hours. **                      [facility]    Final diagnoses:   Urinary tract infection in female   Bacterial vaginosis       Documentation assistance provided by arnold Haynes.  Information recorded by the scribrachana was done at my direction and has been verified and validated by me.     Melecio Haynes  12/12/17 1930       Amarilys Garcia, APRN  12/13/17 0051    "

## 2017-12-15 LAB
BACTERIA SPEC AEROBE CULT: ABNORMAL
BACTERIA SPEC AEROBE CULT: ABNORMAL

## 2017-12-16 LAB
C TRACH RRNA SPEC DONR QL NAA+PROBE: NEGATIVE
N GONORRHOEA DNA SPEC QL NAA+PROBE: NEGATIVE

## 2018-01-10 ENCOUNTER — TRANSCRIBE ORDERS (OUTPATIENT)
Dept: ADMINISTRATIVE | Facility: HOSPITAL | Age: 28
End: 2018-01-10

## 2018-01-10 DIAGNOSIS — R10.9 RIGHT FLANK PAIN: Primary | ICD-10-CM

## 2018-01-12 ENCOUNTER — HOSPITAL ENCOUNTER (OUTPATIENT)
Dept: CT IMAGING | Facility: HOSPITAL | Age: 28
Discharge: HOME | End: 2018-01-12
Admitting: NURSE PRACTITIONER

## 2018-01-12 DIAGNOSIS — R10.9 RIGHT FLANK PAIN: ICD-10-CM

## 2018-01-12 PROCEDURE — 74176 CT ABD & PELVIS W/O CONTRAST: CPT

## 2018-02-19 ENCOUNTER — HOSPITAL ENCOUNTER (EMERGENCY)
Facility: HOSPITAL | Age: 28
Discharge: HOME | End: 2018-02-19
Attending: EMERGENCY MEDICINE | Admitting: EMERGENCY MEDICINE

## 2018-02-19 ENCOUNTER — APPOINTMENT (OUTPATIENT)
Dept: GENERAL RADIOLOGY | Facility: HOSPITAL | Age: 28
End: 2018-02-19

## 2018-02-19 VITALS
SYSTOLIC BLOOD PRESSURE: 110 MMHG | TEMPERATURE: 98 F | OXYGEN SATURATION: 100 % | BODY MASS INDEX: 32.78 KG/M2 | HEART RATE: 86 BPM | DIASTOLIC BLOOD PRESSURE: 68 MMHG | WEIGHT: 185 LBS | RESPIRATION RATE: 16 BRPM | HEIGHT: 63 IN

## 2018-02-19 DIAGNOSIS — M77.51 TENDINITIS OF RIGHT FOOT: Primary | ICD-10-CM

## 2018-02-19 PROCEDURE — 99283 EMERGENCY DEPT VISIT LOW MDM: CPT

## 2018-02-19 PROCEDURE — 73630 X-RAY EXAM OF FOOT: CPT

## 2018-02-19 RX ORDER — DICLOFENAC POTASSIUM 50 MG/1
50 TABLET, FILM COATED ORAL 3 TIMES DAILY
Qty: 15 TABLET | Refills: 0 | Status: SHIPPED | OUTPATIENT
Start: 2018-02-19 | End: 2018-03-11

## 2018-02-20 NOTE — ED PROVIDER NOTES
Subjective   HPI Comments: Patient was walking in the parking garage for the employees 2 weeks ago.  Since she slipped and fell injured the right foot.  Patient reports that she did not have pain for 2 days after the injury but is had pain which is sharp in nature it seems to be worse especially with pointing of the toes or plantarflexion.  Patient states that the pain does not seem to be getting better.  At this point she can barely stand wear shoe.  Said no red streaks no increased warmth to touch and appears no chills no Reiger is no history of gout.    Patient is a 27 y.o. female presenting with lower extremity pain.   History provided by:  Patient   used: No    Lower Extremity Issue   Location:  Foot  Time since incident:  2 weeks  Injury: yes    Mechanism of injury comment:  Patient was slipped and fell while walking in the parking garage 2 weeks ago.  States it did not have pain action to 2 days after the fall.  Foot location:  R foot  Pain details:     Quality:  Sharp and shooting    Radiates to:  Does not radiate    Severity:  Moderate    Onset quality:  Gradual    Timing:  Constant    Progression:  Waxing and waning  Chronicity:  New  Dislocation: no    Prior injury to area:  No  Relieved by:  Immobilization  Worsened by:  Extension and flexion  Ineffective treatments:  None tried  Associated symptoms: decreased ROM, stiffness and swelling    Associated symptoms: no back pain, no fever, no neck pain, no numbness and no tingling        Review of Systems   Constitutional: Negative for chills and fever.   Respiratory: Negative for chest tightness and shortness of breath.    Cardiovascular: Negative for chest pain and palpitations.   Gastrointestinal: Negative for abdominal pain, diarrhea, nausea and vomiting.   Genitourinary: Negative for dysuria, frequency, hematuria and urgency.   Musculoskeletal: Positive for stiffness. Negative for back pain and neck pain.   Skin: Negative for pallor  and rash.   Neurological: Negative for dizziness and weakness.   Psychiatric/Behavioral: Negative.    All other systems reviewed and are negative.      Past Medical History:   Diagnosis Date   • IBS (irritable bowel syndrome)        Allergies   Allergen Reactions   • Keflex [Cephalexin] Rash   • Prednisone Rash   • Procardia [Nifedipine] Palpitations   • Sulfa Antibiotics Rash       Past Surgical History:   Procedure Laterality Date   • TONSILLECTOMY         History reviewed. No pertinent family history.    Social History     Social History   • Marital status: Single     Spouse name: N/A   • Number of children: N/A   • Years of education: N/A     Social History Main Topics   • Smoking status: Never Smoker   • Smokeless tobacco: Never Used   • Alcohol use Yes      Comment: social   • Drug use: No   • Sexual activity: Defer     Other Topics Concern   • None     Social History Narrative   • None           Objective   Physical Exam   Constitutional: She is oriented to person, place, and time. She appears well-developed and well-nourished.   HENT:   Head: Normocephalic and atraumatic.   Eyes: Conjunctivae are normal. Right eye exhibits no discharge. Left eye exhibits no discharge. No scleral icterus.   Neck: Normal range of motion. Neck supple.   Musculoskeletal:   Seneschal right foot reveals no ecchymosis no redness no induration.  She is point tender over the second third fourth metatarsals on the dorsal aspect.  Cap refill less than 2 seconds sensation was intact posterior to Guster's and pulses are palpable there is no crepitus with range of motion.   Neurological: She is alert and oriented to person, place, and time.   Skin: Skin is warm and dry.   Psychiatric: She has a normal mood and affect. Her behavior is normal. Judgment and thought content normal.   Nursing note and vitals reviewed.      Procedures         ED Course  ED Course        No results found for this or any previous visit (from the past 24  "hour(s)).  Note: In addition to lab results from this visit, the labs listed above may include labs taken at another facility or during a different encounter within the last 24 hours. Please correlate lab times with ED admission and discharge times for further clarification of the services performed during this visit.    XR Foot 3+ View Right   Final Result      Normal right foot x-rays.         THIS DOCUMENT HAS BEEN ELECTRONICALLY SIGNED BY XIMENA HOLGUIN MD        Vitals:    02/19/18 1929   BP: 110/68   BP Location: Left arm   Patient Position: Sitting   Pulse: 86   Resp: 16   Temp: 98 °F (36.7 °C)   TempSrc: Oral   SpO2: 100%   Weight: 83.9 kg (185 lb)   Height: 160 cm (63\")     Medications - No data to display  ECG/EMG Results (last 24 hours)     ** No results found for the last 24 hours. **                  MDM  Number of Diagnoses or Management Options  Tendinitis of right foot: new and requires workup     Amount and/or Complexity of Data Reviewed  Tests in the radiology section of CPT®: ordered and reviewed  Discuss the patient with other providers: yes    Patient Progress  Patient progress: stable      Final diagnoses:   Tendinitis of right foot            MAGDALENA Montaño  02/20/18 0707    "

## 2018-02-21 ENCOUNTER — OFFICE VISIT (OUTPATIENT)
Dept: ORTHOPEDIC SURGERY | Facility: CLINIC | Age: 28
End: 2018-02-21

## 2018-02-21 VITALS
WEIGHT: 179.68 LBS | HEIGHT: 63 IN | SYSTOLIC BLOOD PRESSURE: 121 MMHG | DIASTOLIC BLOOD PRESSURE: 83 MMHG | BODY MASS INDEX: 31.84 KG/M2 | HEART RATE: 100 BPM

## 2018-02-21 DIAGNOSIS — S93.621A LISFRANC'S SPRAIN, RIGHT, INITIAL ENCOUNTER: ICD-10-CM

## 2018-02-21 DIAGNOSIS — M79.671 FOOT PAIN, RIGHT: Primary | ICD-10-CM

## 2018-02-21 PROCEDURE — 99204 OFFICE O/P NEW MOD 45 MIN: CPT | Performed by: PHYSICIAN ASSISTANT

## 2018-02-21 NOTE — PROGRESS NOTES
[facility] Note    Subjective     Patient: Dulce Ospina  : 1990    Primary Care Provider: GAVIN Bautista    Requesting Provider: As above    Pain of the Right Foot      History    Chief Complaint: Right foot pain    History of Present Illness: This is a very pleasant 27 year old female presenting today with a 2 week history of right foot pain.  She reports pain is only on the dorsum of the foot.  She has no pain with walking a flat surface, but pain with stairs.  He reports pain with pressure over the dorsum of the foot.  She is more comfortable barefoot that in shoes.  She reports it began several days after getting her foot caught under stair the parking garage.  She reports it has been swelling intermittently.  No redness.  All the pain is in the foot with no radiating pain.  She rates the pain to be 10/10 with shoe pressure and sharp in nature.  She's had no previous injuries to this foot.  She went to the [facility] emergency room on 18 with nonweightbearing negative x-rays his been wearing a short boot since then.  She reports the boot does not improve her pain is there is pressure on the top of the foot.  She is not taking any medication for the pain.    Current Outpatient Prescriptions on File Prior to Visit   Medication Sig Dispense Refill   • ondansetron (ZOFRAN) 4 MG tablet Take 1 tablet by mouth Every 8 (Eight) Hours As Needed for Nausea or Vomiting. (Patient taking differently: Take 4 mg by mouth As Needed for Nausea or Vomiting.) 20 tablet 0   • diclofenac (CATAFLAM) 50 MG tablet Take 1 tablet by mouth 3 (Three) Times a Day. 15 tablet 0   • [DISCONTINUED] diphenoxylate-atropine (LOMOTIL) 2.5-0.025 MG per tablet Take 1 tablet by mouth 4 (Four) Times a Day As Needed for Diarrhea.     • [DISCONTINUED] emtricitabine-tenofovir (TRUVADA) 200-300 MG per tablet Take 1 tablet by mouth Daily. 30 tablet 0   • [DISCONTINUED] metroNIDAZOLE (FLAGYL) 500  MG tablet Take 1 tablet by mouth 2 (Two) Times a Day. 14 tablet 0   • [DISCONTINUED] naproxen (NAPROSYN) 500 MG tablet Take 1 tablet by mouth 2 (Two) Times a Day As Needed for Mild Pain (1-3). 20 tablet 0   • [DISCONTINUED] nitrofurantoin, macrocrystal-monohydrate, (MACROBID) 100 MG capsule Take 1 capsule by mouth 2 (Two) Times a Day. 20 capsule 0   • [DISCONTINUED] raltegravir (ISENTRESS) 400 MG tablet Take 1 tablet by mouth 2 (Two) Times a Day. 60 tablet 0     No current facility-administered medications on file prior to visit.       Allergies   Allergen Reactions   • Keflex [Cephalexin] Rash   • Prednisone Rash   • Procardia [Nifedipine] Palpitations   • Sulfa Antibiotics Rash      Past Medical History:   Diagnosis Date   • IBS (irritable bowel syndrome)      Past Surgical History:   Procedure Laterality Date   • TONSILLECTOMY       Family History   Problem Relation Age of Onset   • No Known Problems Mother    • No Known Problems Father       Social History     Social History   • Marital status: Single     Spouse name: N/A   • Number of children: N/A   • Years of education: N/A     Occupational History   • Not on file.     Social History Main Topics   • Smoking status: Never Smoker   • Smokeless tobacco: Never Used   • Alcohol use Yes      Comment: social   • Drug use: No   • Sexual activity: Defer     Other Topics Concern   • Not on file     Social History Narrative        Review of Systems   Constitutional: Negative.    HENT: Negative.    Eyes: Negative.    Respiratory: Negative.    Cardiovascular: Negative.    Gastrointestinal: Negative.    Endocrine: Negative.    Genitourinary: Negative.    Musculoskeletal: Positive for arthralgias.   Skin: Negative.    Allergic/Immunologic: Negative.    Neurological: Negative.    Hematological: Negative.    Psychiatric/Behavioral: Negative.        The following portions of the patient's history were reviewed and updated as appropriate: allergies, current medications, past  "family history, past medical history, past social history, past surgical history and problem list.      Objective      Physical Exam  /83  Pulse 100  Ht 160 cm (63\")  Wt 81.5 kg (179 lb 10.8 oz)  BMI 31.83 kg/m2    Body mass index is 31.83 kg/(m^2).    GENERAL: Body habitus: obese    Lower extremity edema: Left: none; Right: none    Varicose veins:  Left: none; Right: none    Gait: antalgic     Mental Status:  awake and alert; oriented to person, place, and time    Voice:  clear  SKIN:  Normal    Hair Growth:  Right:normal; Left:  normal  HEENT: Head: Normocephalic, atraumatic,  without obvious abnormality.  eye: normal external eye, no icterus   PULM:  Repiratory effort normal    Ortho Exam  V:  Dorsalis Pedis:  Right: 2+; Left:2+    Posterior Tibial: Right:2+; Left:2+    Capillary Refill:  Brisk  MSK:  Tibia:  Right:  non tender; Left:  non tender      Ankle:  Right: non tender, ROM  normal and motor function  normal; Left:  non tender, ROM  normal and motor function  normal      Foot:  Right:  tender generalized tenderness with swelling and very tender at the medial and middle cuneiform pain with motion of that joint, ROM  normal and motor function  normal; Left:  non tender, ROM  normal and motor function  normal      NEURO  Lower extremity sensation: intact     Calf Atrophy:none    Motor Function: all 5/5          Medical Decision Making    Data Review:   ordered and reviewed x-rays today    Assessment:  1. Foot pain, right    2. Lisfranc's sprain, right, initial encounter        Plan:  Right foot pain with concern for Lisfranc injury.  I reviewed today's x-rays and clinical findings with the patient.  On exam, she has generalized dorsal right foot tenderness with increased tenderness at the medial and middle cuneiforms with pain with motion of that joint.  X-rays today show possible widening between the base of the first metatarsal cuneiform as well as between the medial and middle cuneiforms with " concern for Lisfranc injury.  Her mechanism of injury is also consistent with a Lisfranc.  She has been weightbearing in a short boot.  It is been 2 weeks since initial injury.  Recommendation today is MRI of the right foot for further evaluation.  I explained to her that based on the results of the MRI, she may need surgery, may need casting or may continue with the boot.  I would recommend she continue with the short boot and should begin using crutches and remain NWB.  She will return after the MRI for further treatment recommendations.      Sarah Wilcox PA-C  02/21/18  2:00 PM

## 2018-02-23 ENCOUNTER — HOSPITAL ENCOUNTER (OUTPATIENT)
Dept: MRI IMAGING | Facility: HOSPITAL | Age: 28
Discharge: HOME | End: 2018-02-23
Admitting: PHYSICIAN ASSISTANT

## 2018-02-23 DIAGNOSIS — S93.621A LISFRANC'S SPRAIN, RIGHT, INITIAL ENCOUNTER: ICD-10-CM

## 2018-02-23 DIAGNOSIS — M79.671 FOOT PAIN, RIGHT: ICD-10-CM

## 2018-02-23 PROCEDURE — 73718 MRI LOWER EXTREMITY W/O DYE: CPT

## 2018-02-27 ENCOUNTER — OFFICE VISIT (OUTPATIENT)
Dept: ORTHOPEDIC SURGERY | Facility: CLINIC | Age: 28
End: 2018-02-27

## 2018-02-27 DIAGNOSIS — S99.921D RIGHT FOOT INJURY, SUBSEQUENT ENCOUNTER: Primary | ICD-10-CM

## 2018-02-27 PROCEDURE — 99213 OFFICE O/P EST LOW 20 MIN: CPT | Performed by: PHYSICIAN ASSISTANT

## 2018-02-27 RX ORDER — IBUPROFEN 200 MG
400 TABLET ORAL AS NEEDED
COMMUNITY
End: 2018-07-27

## 2018-02-27 NOTE — PROGRESS NOTES
[facility] [place] [facility] Clinic Note    Subjective     Patient: Dulce Ospina  : 1990    Primary Care Provider: GAVIN Bautista    Requesting Provider: As above    Follow-up of the Right Foot (Post MRI f/u/)      History    Chief Complaint: Follow-up right foot MRI    History of Present Illness: Patient returns for follow-up of her right foot MRI with concerns for Lisfranc injury.  She reports she she is continued in her boot putting as little weight as possible on the foot.  She reports there is been no change in the pain.  It is still dorsal pain with pressure over the foot.  She reports increased swelling and pain at the end of the day.  No new symptoms.  She still reports the worst of her pain of that is on the dorsolateral aspect of the foot.    Current Outpatient Prescriptions on File Prior to Visit   Medication Sig Dispense Refill   • diclofenac (CATAFLAM) 50 MG tablet Take 1 tablet by mouth 3 (Three) Times a Day. 15 tablet 0     No current facility-administered medications on file prior to visit.       Allergies   Allergen Reactions   • Keflex [Cephalexin] Rash   • Prednisone Rash   • Procardia [Nifedipine] Palpitations   • Sulfa Antibiotics Rash      Past Medical History:   Diagnosis Date   • IBS (irritable bowel syndrome)      Past Surgical History:   Procedure Laterality Date   • TONSILLECTOMY       Family History   Problem Relation Age of Onset   • No Known Problems Mother    • No Known Problems Father       Social History     Social History   • Marital status: Single     Spouse name: N/A   • Number of children: N/A   • Years of education: N/A     Occupational History   • Not on file.     Social History Main Topics   • Smoking status: Never Smoker   • Smokeless tobacco: Never Used   • Alcohol use Yes      Comment: social   • Drug use: No   • Sexual activity: Defer     Other Topics Concern   • Not on file     Social History Narrative        Review of Systems    The  following portions of the patient's history were reviewed and updated as appropriate: allergies, current medications, past family history, past medical history, past social history, past surgical history and problem list.      Objective      Physical Exam  There were no vitals taken for this visit.    There is no height or weight on file to calculate BMI.      Ortho Exam  Right foot exam:  Very tender over the dorsolateral aspect of the foot  Diffuse mild swelling about the foot  Motor function intact  NVI distally    Medical Decision Making    Data Review:   reviewed radiology images  MRI right foot 2/23/18 shows extensive soft soft tissue swelling on the dorsolateral aspect of the foot.  No evidence of Lisfranc injury no evidence of ligament or tendon or bony pathology.    Assessment:  1. Right foot injury, subsequent encounter        Plan:  1.  Right foot injury.  I reviewed MRI findings and clinical findings with the patient.  On exam, she is still very tender with the dorsolateral aspect of the foot with swelling.  There is no bruising ecchymosis or erythema.  I expect her that her MRI shows extensive soft tissue swelling with no evidence of tendon, ligament or bony injury.  I told the patient that I had Dr. Khan review the MRI images as well.  I explained to the patient that it can take a couple of months for an injury like this to resolve.  Given it is a foot injury, it will stay swollen and Fort sore for longer period of time.  Recommendation today is that she continue with the boot for comfort.  She should keep her foot elevated as much as possible.  She can discontinue the crutches as comfortable.  She return to see me in 3-4 weeks or sooner if needed.      Sarah Wilcox PA-C  02/27/18  1:20 PM

## 2018-03-11 ENCOUNTER — OFFICE VISIT (OUTPATIENT)
Dept: RETAIL CLINIC | Facility: CLINIC | Age: 28
End: 2018-03-11

## 2018-03-11 VITALS
BODY MASS INDEX: 32.25 KG/M2 | WEIGHT: 182 LBS | OXYGEN SATURATION: 99 % | HEIGHT: 63 IN | RESPIRATION RATE: 20 BRPM | HEART RATE: 78 BPM | TEMPERATURE: 97.9 F

## 2018-03-11 DIAGNOSIS — R68.89 FLU-LIKE SYMPTOMS: Primary | ICD-10-CM

## 2018-03-11 DIAGNOSIS — Z20.828 EXPOSURE TO THE FLU: ICD-10-CM

## 2018-03-11 LAB
EXPIRATION DATE: NORMAL
FLUAV AG NPH QL: NEGATIVE
FLUBV AG NPH QL: NEGATIVE
INTERNAL CONTROL: NORMAL
Lab: NORMAL

## 2018-03-11 PROCEDURE — 87804 INFLUENZA ASSAY W/OPTIC: CPT | Performed by: NURSE PRACTITIONER

## 2018-03-11 PROCEDURE — 99213 OFFICE O/P EST LOW 20 MIN: CPT | Performed by: NURSE PRACTITIONER

## 2018-03-11 RX ORDER — OSELTAMIVIR PHOSPHATE 75 MG/1
75 CAPSULE ORAL 2 TIMES DAILY
Qty: 10 CAPSULE | Refills: 0 | Status: SHIPPED | OUTPATIENT
Start: 2018-03-11 | End: 2018-03-16

## 2018-03-11 NOTE — PATIENT INSTRUCTIONS
"Influenza, Adult  Influenza (“the flu\") is an infection in the lungs, nose, and throat (respiratory tract). It is caused by a virus. The flu causes many common cold symptoms, as well as a high fever and body aches. It can make you feel very sick.  The flu spreads easily from person to person (is contagious). Getting a flu shot (influenza vaccination) every year is the best way to prevent the flu.  Follow these instructions at home:  · Take over-the-counter and prescription medicines only as told by your doctor.  · Use a cool mist humidifier to add moisture (humidity) to the air in your home. This can make it easier to breathe.  · Rest as needed.  · Drink enough fluid to keep your pee (urine) clear or pale yellow.  · Cover your mouth and nose when you cough or sneeze.  · Wash your hands with soap and water often, especially after you cough or sneeze. If you cannot use soap and water, use hand .  · Stay home from work or school as told by your doctor. Unless you are visiting your doctor, try to avoid leaving home until your fever has been gone for 24 hours without the use of medicine.  · Keep all follow-up visits as told by your doctor. This is important.  How is this prevented?  · Getting a yearly (annual) flu shot is the best way to avoid getting the flu. You may get the flu shot in late summer, fall, or winter. Ask your doctor when you should get your flu shot.  · Wash your hands often or use hand  often.  · Avoid contact with people who are sick during cold and flu season.  · Eat healthy foods.  · Drink plenty of fluids.  · Get enough sleep.  · Exercise regularly.  Contact a doctor if:  · You get new symptoms.  · You have:  ¨ Chest pain.  ¨ Watery poop (diarrhea).  ¨ A fever.  · Your cough gets worse.  · You start to have more mucus.  · You feel sick to your stomach (nauseous).  · You throw up (vomit).  Get help right away if:  · You start to be short of breath or have trouble breathing.  · Your " skin or nails turn a bluish color.  · You have very bad pain or stiffness in your neck.  · You get a sudden headache.  · You get sudden pain in your face or ear.  · You cannot stop throwing up.  This information is not intended to replace advice given to you by your health care provider. Make sure you discuss any questions you have with your health care provider.  Document Released: 09/26/2009 Document Revised: 05/25/2017 Document Reviewed: 10/11/2016  [employer] Patient Education © 2017 [employer]    Flu test is negative, discussed results with patient. Tamiflu started due to symptoms and exposure.  Medication and plan of care reviewed with patient and teaching done on med reaction/side effects. Take medication as prescribed, do not take over the counter medications except as discussed, or drink alcohol while on medication.  Monitor for drowsiness with medication, do not drive if drowsiness occurs.  Rest, plenty of fluids, comfort measures, humidifier, warm salt water gargles, saline spray, fever/pain control, and follow up with PCP if symptoms persist.  If worse in any way go to urgent care or ER as discussed.

## 2018-03-11 NOTE — PROGRESS NOTES
Subjective   Dulce Ospina is a 27 y.o. female presents with flu like symptoms.  States daughter has had both types of flu in the last couple of weeks.  did have the flu shot this season.    Flu Symptoms   This is a new problem. The current episode started yesterday. The problem has been gradually worsening. Associated symptoms include chills, congestion, coughing (dry cough), fatigue, a fever (subjective, has not checked), headaches, myalgias and a sore throat. Pertinent negatives include no abdominal pain, chest pain, nausea, neck pain, rash, swollen glands, urinary symptoms, visual change or vomiting. Nothing aggravates the symptoms. She has tried NSAIDs for the symptoms. The treatment provided mild relief.        The following portions of the patient's history were reviewed and updated as appropriate: allergies, current medications, past family history, past medical history, past social history and past surgical history.    Review of Systems   Constitutional: Positive for chills, fatigue and fever (subjective, has not checked). Negative for unexpected weight change.   HENT: Positive for congestion, postnasal drip, rhinorrhea, sinus pressure, sneezing and sore throat. Negative for ear discharge, ear pain, trouble swallowing and voice change.    Eyes: Negative.    Respiratory: Positive for cough (dry cough). Negative for chest tightness, shortness of breath, wheezing and stridor.    Cardiovascular: Negative.  Negative for chest pain.   Gastrointestinal: Negative.  Negative for abdominal pain, nausea and vomiting.   Genitourinary: Negative.    Musculoskeletal: Positive for myalgias. Negative for neck pain.   Skin: Negative for rash.   Neurological: Positive for headaches. Negative for dizziness, syncope and light-headedness.       Objective   Physical Exam   Constitutional: She is oriented to person, place, and time. She appears well-developed and well-nourished. No distress.   HENT:   Head:  Normocephalic and atraumatic.   Right Ear: A middle ear effusion is present.   Left Ear: A middle ear effusion is present.   Nose: Mucosal edema present. No sinus tenderness.   Mouth/Throat: Uvula is midline and mucous membranes are normal. Posterior oropharyngeal erythema (mild erythema with PND) present. Tonsils are 0 on the right. Tonsils are 0 on the left. No tonsillar exudate.   Eyes: Conjunctivae and lids are normal. Pupils are equal, round, and reactive to light.   Neck: Normal range of motion.   Cardiovascular: Normal rate, regular rhythm and normal heart sounds.    No murmur heard.  Pulmonary/Chest: Effort normal and breath sounds normal. No respiratory distress.   Lymphadenopathy:     She has no cervical adenopathy.   Neurological: She is alert and oriented to person, place, and time.   Skin: Skin is warm and dry.   Psychiatric: She has a normal mood and affect. Her speech is normal and behavior is normal.       Assessment/Plan   Dulce was seen today for flu symptoms.    Diagnoses and all orders for this visit:    Flu-like symptoms  -     POC Influenza A / B  Results for orders placed or performed in visit on 03/11/18   POC Influenza A / B   Result Value Ref Range    Rapid Influenza A Ag negative     Rapid Influenza B Ag negative     Internal Control Passed Passed    Lot Number [id number]     Expiration Date 11/2,020          Exposure to the flu  -     oseltamivir (TAMIFLU) 75 MG capsule; Take 1 capsule by mouth 2 (Two) Times a Day for 5 days.        Flu test is negative, discussed results with patient. Tamiflu started due to symptoms and exposure.  Medication and plan of care reviewed with patient and teaching done on med reaction/side effects. Take medication as prescribed, do not take over the counter medications except as discussed, or drink alcohol while on medication.  Monitor for drowsiness with medication, do not drive if drowsiness occurs.  Rest, plenty of fluids, comfort measures, humidifier, warm  salt water gargles, saline spray, fever/pain control, and follow up with PCP if symptoms persist.  If worse in any way go to urgent care or ER as discussed.  Patient verbalized understanding.    GAVIN Morales

## 2018-03-27 ENCOUNTER — LAB (OUTPATIENT)
Dept: LAB | Facility: HOSPITAL | Age: 28
End: 2018-03-27
Attending: INTERNAL MEDICINE

## 2018-03-27 ENCOUNTER — OFFICE VISIT (OUTPATIENT)
Dept: ORTHOPEDIC SURGERY | Facility: CLINIC | Age: 28
End: 2018-03-27

## 2018-03-27 ENCOUNTER — TRANSCRIBE ORDERS (OUTPATIENT)
Dept: LAB | Facility: HOSPITAL | Age: 28
End: 2018-03-27

## 2018-03-27 VITALS
HEART RATE: 84 BPM | DIASTOLIC BLOOD PRESSURE: 82 MMHG | WEIGHT: 183 LBS | SYSTOLIC BLOOD PRESSURE: 121 MMHG | BODY MASS INDEX: 32.43 KG/M2 | HEIGHT: 63 IN

## 2018-03-27 DIAGNOSIS — M79.671 RIGHT FOOT PAIN: Primary | ICD-10-CM

## 2018-03-27 DIAGNOSIS — J02.9 ACUTE PHARYNGITIS, UNSPECIFIED ETIOLOGY: ICD-10-CM

## 2018-03-27 DIAGNOSIS — J02.9 ACUTE PHARYNGITIS, UNSPECIFIED ETIOLOGY: Primary | ICD-10-CM

## 2018-03-27 LAB — S PYO AG THROAT QL: NEGATIVE

## 2018-03-27 PROCEDURE — 99212 OFFICE O/P EST SF 10 MIN: CPT | Performed by: PHYSICIAN ASSISTANT

## 2018-03-27 PROCEDURE — 87880 STREP A ASSAY W/OPTIC: CPT

## 2018-03-27 PROCEDURE — 87081 CULTURE SCREEN ONLY: CPT

## 2018-03-27 RX ORDER — FLUCONAZOLE 150 MG/1
TABLET ORAL
COMMUNITY
Start: 2018-02-07 | End: 2018-07-27

## 2018-03-27 NOTE — PROGRESS NOTES
[facility] [place] [facility] [place]    Subjective     Patient: Dulce Ospina  : 1990    Primary Care Provider: GAVIN Bautista    Requesting Provider: As above    Follow-up of the Right Foot ((R) Foot Injury)      History    Chief Complaint: Right foot f/up    History of Present Illness: Patient returns for follow-up of her right foot injury.  She's been compliant in wearing the boot.  She reports she was able to go into a regular shoe this weekend.  She notes some increased swelling on certain days when she has increased activity.  She reports pain 85% better than her last visit.  She denies any numbness or tingling.  No new symptoms.    Current Outpatient Prescriptions on File Prior to Visit   Medication Sig Dispense Refill   • ibuprofen (ADVIL,MOTRIN) 200 MG tablet Take 400 mg by mouth As Needed for Mild Pain .       No current facility-administered medications on file prior to visit.       Allergies   Allergen Reactions   • Keflex [Cephalexin] Rash   • Prednisone Rash   • Procardia [Nifedipine] Palpitations   • Sulfa Antibiotics Rash      Past Medical History:   Diagnosis Date   • IBS (irritable bowel syndrome)      Past Surgical History:   Procedure Laterality Date   • TONSILLECTOMY       Family History   Problem Relation Age of Onset   • No Known Problems Mother    • No Known Problems Father       Social History     Social History   • Marital status: Single     Spouse name: N/A   • Number of children: N/A   • Years of education: N/A     Occupational History   • Not on file.     Social History Main Topics   • Smoking status: Never Smoker   • Smokeless tobacco: Never Used   • Alcohol use Yes      Comment: social   • Drug use: No   • Sexual activity: Yes     Birth control/ protection: Implant     Other Topics Concern   • Not on file     Social History Narrative   • No narrative on file        Review of Systems   Musculoskeletal: Positive for joint swelling.        Foot Pain   "      The following portions of the patient's history were reviewed and updated as appropriate: allergies, current medications, past family history, past medical history, past social history, past surgical history and problem list.      Objective      Physical Exam  /82   Pulse 84   Ht 160 cm (62.99\")   Wt 83 kg (183 lb)   LMP 03/01/2018 (Approximate)   BMI 32.43 kg/m²     Body mass index is 32.43 kg/m².      Ortho Exam  Right foot exam:  Nontender about the foot.  Mild swelling  Yellow resolving bruise on dorsum of foot  Normal motion and strength  NVI  Pulses 2+    Medical Decision Making    Data Review:   none    Assessment:  1. Right foot pain        Plan:  Right foot pain from injury proximally 6 weeks ago.  Patient reports 85% improvement of pain.  She still notices some increased swelling if she is up and on her feet too long.  She wore a regular shoe all weekend.  No new symptoms.  On exam, she is nontender with normal motion and strength.  I reassured the patient that it is normal to have swelling after a foot or ankle injury for months.  Should slowly resolve.  Often times, will keep people from going back into a regular shoe is the swelling.  Recommendation today is that she wean herself out of the boot and into a regular shoe as tolerated.  She has had negative x-rays with MRI just showing soft tissue edema.  She will return to see us as needed.      Sarah Wilcox PA-C  03/27/18  11:23 AM  "

## 2018-03-29 LAB — BACTERIA SPEC AEROBE CULT: NORMAL

## 2018-07-27 ENCOUNTER — OFFICE VISIT (OUTPATIENT)
Dept: RETAIL CLINIC | Facility: CLINIC | Age: 28
End: 2018-07-27

## 2018-07-27 VITALS
DIASTOLIC BLOOD PRESSURE: 68 MMHG | RESPIRATION RATE: 24 BRPM | BODY MASS INDEX: 32.64 KG/M2 | SYSTOLIC BLOOD PRESSURE: 90 MMHG | WEIGHT: 184.2 LBS | TEMPERATURE: 98.2 F | HEIGHT: 63 IN | HEART RATE: 88 BPM | OXYGEN SATURATION: 98 %

## 2018-07-27 DIAGNOSIS — H65.91 OTITIS MEDIA WITH EFFUSION, RIGHT: Primary | ICD-10-CM

## 2018-07-27 PROCEDURE — 99213 OFFICE O/P EST LOW 20 MIN: CPT | Performed by: NURSE PRACTITIONER

## 2018-07-27 RX ORDER — SPIRONOLACTONE 50 MG/1
50 TABLET, FILM COATED ORAL DAILY
COMMUNITY
End: 2022-06-06

## 2018-07-27 RX ORDER — LORATADINE 10 MG/1
10 TABLET ORAL DAILY
COMMUNITY
Start: 2018-07-27 | End: 2020-03-08

## 2018-07-27 RX ORDER — IBUPROFEN 800 MG/1
800 TABLET ORAL EVERY 6 HOURS PRN
Qty: 30 TABLET | Refills: 0 | OUTPATIENT
Start: 2018-07-27 | End: 2020-03-08

## 2018-07-27 RX ORDER — PHENTERMINE HYDROCHLORIDE 37.5 MG/1
37.5 TABLET ORAL
COMMUNITY
End: 2022-06-06

## 2018-07-27 NOTE — PROGRESS NOTES
"Subjective   Dulce Ospina is a 27 y.o. female.   Chief Complaint   Patient presents with   • Earache      28 yo w/f presents with complaint of earache onset this am when she stood up, she reports pain, 6/10 at worst, and it has been waxing and waning since then.  She has taken no medication.  See HPI/ROS for additonal information.      Earache    There is pain in the left ear. This is a new problem. The current episode started today. The problem has been waxing and waning. There has been no fever. The pain is at a severity of 6/10. Pertinent negatives include no ear discharge, hearing loss, rhinorrhea or sore throat. She has tried nothing for the symptoms.        The following portions of the patient's history were reviewed and updated as appropriate: allergies, current medications, past family history, past medical history, past social history, past surgical history and problem list.    Current Outpatient Prescriptions:   •  phentermine (ADIPEX-P) 37.5 MG tablet, Take 37.5 mg by mouth., Disp: , Rfl:   •  spironolactone (ALDACTONE) 50 MG tablet, Take 50 mg by mouth Daily., Disp: , Rfl:   •  ibuprofen (ADVIL,MOTRIN) 800 MG tablet, Take 1 tablet by mouth Every 6 (Six) Hours As Needed for Moderate Pain ., Disp: 30 tablet, Rfl: 0  •  loratadine (CLARITIN) 10 MG tablet, Take 1 tablet by mouth Daily., Disp: , Rfl:     Review of Systems   Constitutional: Negative.    HENT: Positive for ear pain. Negative for congestion, dental problem, drooling, ear discharge, facial swelling, hearing loss, mouth sores, postnasal drip, rhinorrhea, sinus pain, sinus pressure, sore throat and tinnitus.    Eyes: Negative.    Respiratory: Negative.    Cardiovascular: Negative.    Gastrointestinal: Negative.    Skin: Negative.    Psychiatric/Behavioral: Negative.      BP 90/68   Pulse 88   Temp 98.2 °F (36.8 °C) (Temporal Artery )   Resp 24   Ht 160 cm (63\")   Wt 83.6 kg (184 lb 3.2 oz)   SpO2 98%   BMI 32.63 kg/m²     Objective "   Allergies   Allergen Reactions   • Keflex [Cephalexin] Rash   • Prednisone Rash   • Procardia [Nifedipine] Palpitations   • Sulfa Antibiotics Rash       Physical Exam   Constitutional: She appears well-developed and well-nourished. No distress.   HENT:   Head: Normocephalic.   Right Ear: Hearing, tympanic membrane, external ear and ear canal normal.   Left Ear: External ear and ear canal normal. A middle ear effusion is present.   Mouth/Throat: Oropharynx is clear and moist.   Eyes: Conjunctivae are normal.   Neck: Normal range of motion. Neck supple. No JVD present. No tracheal deviation present. No thyromegaly present.   Cardiovascular: Normal rate, regular rhythm and normal heart sounds.    Pulmonary/Chest: Effort normal and breath sounds normal. No stridor.   Lymphadenopathy:     She has no cervical adenopathy.   Skin: Skin is warm and dry. Capillary refill takes less than 2 seconds. She is not diaphoretic.   Psychiatric: She has a normal mood and affect. Her behavior is normal.   Vitals reviewed.      Assessment/Plan   Dulce was seen today for earache.    Diagnoses and all orders for this visit:    Otitis media with effusion, right    Other orders  -     loratadine (CLARITIN) 10 MG tablet; Take 1 tablet by mouth Daily.  -     ibuprofen (ADVIL,MOTRIN) 800 MG tablet; Take 1 tablet by mouth Every 6 (Six) Hours As Needed for Moderate Pain .           An After Visit Summary was printed, reviewed, and given to the patient. Understanding verbalized and agrees with treatment plan.  If no improvement or becomes worse, follow up with primary or go to [facility]/ER.          July 27, 2018 3:17 PM

## 2019-01-15 ENCOUNTER — OFFICE VISIT (OUTPATIENT)
Dept: OBSTETRICS AND GYNECOLOGY | Facility: CLINIC | Age: 29
End: 2019-01-15

## 2019-01-15 VITALS
DIASTOLIC BLOOD PRESSURE: 74 MMHG | HEIGHT: 63 IN | BODY MASS INDEX: 35.44 KG/M2 | WEIGHT: 200 LBS | SYSTOLIC BLOOD PRESSURE: 106 MMHG

## 2019-01-15 DIAGNOSIS — Z30.09 COUNSELING FOR BIRTH CONTROL, ORAL CONTRACEPTIVES: ICD-10-CM

## 2019-01-15 DIAGNOSIS — Z30.432 ENCOUNTER FOR IUD REMOVAL: Primary | ICD-10-CM

## 2019-01-15 PROCEDURE — 58301 REMOVE INTRAUTERINE DEVICE: CPT | Performed by: OBSTETRICS & GYNECOLOGY

## 2019-01-15 RX ORDER — DROSPIRENONE AND ETHINYL ESTRADIOL 0.02-3(28)
1 KIT ORAL DAILY
Qty: 28 TABLET | Refills: 12 | Status: SHIPPED | OUTPATIENT
Start: 2019-01-15 | End: 2019-12-11 | Stop reason: SDUPTHER

## 2019-01-15 NOTE — PROGRESS NOTES
IUD Removal    Date of procedure:  1/15/2019    Risks and benefits discussed? yes  All questions answered? yes  Consents given by The patient  Written consent obtained? yes  Reason for removal: Side effect: Bleeding and unwanted hair growth    Local anesthesia used:  no    Procedure documentation:    A speculum was placed in order to view the cervix.  A tenaculum did not need to be placed on the anterior cervical lip.  Cervical dilation did not need to be performed in order to access the string.  The IUD string was easily seen.  The string was grasped and the IUD was removed without difficulty.  The IUD did not appear to be adherent to the uterine cavity. It was removed intact.    She tolerated the procedure without any difficulty.     Post procedure instructions: Patient notified to call with heavy bleeding, fever or increasing pain.    Follow up for annual exam and PRN will start Krys 28 for birth control    This note was electronically signed.    Abiodun Martinez MD    January 15, 2019

## 2019-01-16 ENCOUNTER — HOSPITAL ENCOUNTER (EMERGENCY)
Facility: HOSPITAL | Age: 29
Discharge: HOME | End: 2019-01-16
Attending: EMERGENCY MEDICINE | Admitting: EMERGENCY MEDICINE

## 2019-01-16 VITALS
OXYGEN SATURATION: 98 % | TEMPERATURE: 98.5 F | SYSTOLIC BLOOD PRESSURE: 110 MMHG | BODY MASS INDEX: 35.44 KG/M2 | DIASTOLIC BLOOD PRESSURE: 71 MMHG | RESPIRATION RATE: 18 BRPM | WEIGHT: 200 LBS | HEIGHT: 63 IN | HEART RATE: 101 BPM

## 2019-01-16 DIAGNOSIS — N76.4 LABIAL ABSCESS: Primary | ICD-10-CM

## 2019-01-16 PROCEDURE — 87205 SMEAR GRAM STAIN: CPT | Performed by: NURSE PRACTITIONER

## 2019-01-16 PROCEDURE — 87070 CULTURE OTHR SPECIMN AEROBIC: CPT | Performed by: NURSE PRACTITIONER

## 2019-01-16 PROCEDURE — 99283 EMERGENCY DEPT VISIT LOW MDM: CPT

## 2019-01-16 RX ORDER — CLINDAMYCIN HYDROCHLORIDE 300 MG/1
300 CAPSULE ORAL 4 TIMES DAILY
Qty: 40 CAPSULE | Refills: 0 | OUTPATIENT
Start: 2019-01-16 | End: 2020-03-08

## 2019-01-16 RX ADMIN — Medication 3 ML: at 10:10

## 2019-01-16 NOTE — ED PROVIDER NOTES
Subjective   Patient is a 28-year-old female who presents with 2 week history of right labial abscess.  States that over the past 3 days that the lesion has gotten more swollen, red and tender.  Denies fever, or drainage from the site.  Reports history of MRSA        History provided by:  Patient and relative  Abscess   Abscess location: Right labia majora.  Size:  1cm  Abscess quality: fluctuance, induration, painful, redness and warmth    Red streaking: no    Duration:  14 days  Progression:  Worsening  Pain details:     Quality:  Throbbing    Severity:  Mild    Duration:  3 days    Timing:  Constant    Progression:  Worsening  Chronicity:  New  Context comment:  Shaves in that area, history of MRSA  Relieved by:  Nothing  Exacerbated by: Pressure or palpation.  Ineffective treatments:  None tried  Associated symptoms: no fever, no nausea and no vomiting    Risk factors: hx of MRSA        Review of Systems   Constitutional: Negative for chills and fever.   Gastrointestinal: Negative for nausea and vomiting.   All other systems reviewed and are negative.      Past Medical History:   Diagnosis Date   • Adult acne    • IBS (irritable bowel syndrome)    • Overweight        Allergies   Allergen Reactions   • Keflex [Cephalexin] Rash   • Prednisone Rash   • Procardia [Nifedipine] Palpitations   • Sulfa Antibiotics Rash       Past Surgical History:   Procedure Laterality Date   • TONSILLECTOMY         Family History   Problem Relation Age of Onset   • No Known Problems Mother    • Hemophilia Father    • Hepatitis Father        Social History     Socioeconomic History   • Marital status: Single     Spouse name: Not on file   • Number of children: Not on file   • Years of education: Not on file   • Highest education level: Not on file   Tobacco Use   • Smoking status: Never Smoker   • Smokeless tobacco: Never Used   Substance and Sexual Activity   • Alcohol use: Yes     Comment: social   • Drug use: No   • Sexual activity:  Yes     Birth control/protection: Implant           Objective   Physical Exam   Constitutional: She is oriented to person, place, and time. She appears well-developed and well-nourished.   HENT:   Right Ear: External ear normal.   Left Ear: External ear normal.   Eyes: Conjunctivae are normal.   Cardiovascular: Normal rate and regular rhythm.   Pulmonary/Chest: Effort normal and breath sounds normal.   Genitourinary:       Pelvic exam was performed with patient supine. There is tenderness (majora) on the right labia.   Lymphadenopathy: No inguinal adenopathy noted on the right or left side.   Neurological: She is alert and oriented to person, place, and time.   Skin: Skin is warm and dry.   Psychiatric: She has a normal mood and affect. Her behavior is normal.   Vitals reviewed.      Incision & Drainage  Date/Time: 1/16/2019 11:25 AM  Performed by: Jen Martin APRN  Authorized by: Grabiel Cárdenas MD     Consent:     Consent obtained:  Verbal    Consent given by:  Patient    Risks discussed:  Bleeding, incomplete drainage, infection and pain  Location:     Type:  Abscess    Size:  1.5cm    Location:  Anogenital    Anogenital location:  Vulva (right labia majora)  Pre-procedure details:     Skin preparation:  Chloraprep  Anesthesia (see MAR for exact dosages):     Anesthesia method:  Topical application    Topical anesthetic:  LET  Procedure type:     Complexity:  Simple  Procedure details:     Needle aspiration: yes      Needle size:  20 G    Drainage:  Purulent    Drainage amount:  Scant    Wound treatment:  Wound left open    Packing materials:  None  Post-procedure details:     Patient tolerance of procedure:  Tolerated well, no immediate complications  Comments:      Sent for culture               ED Course      Advised patient to follow with her primary care provider in 2-3 days or return to the ER with worsening symptoms or development of new symptoms.  Advised patient to take antibiotics exactly as  prescribed finishing entire course of therapy.  Patient and mother verbalized understanding of all discussed            MDM      Final diagnoses:   Labial abscess            Jen Martin, APRN  01/16/19 1124

## 2019-01-19 LAB
BACTERIA SPEC AEROBE CULT: NORMAL
GRAM STN SPEC: NORMAL

## 2019-02-14 ENCOUNTER — TELEPHONE (OUTPATIENT)
Dept: OBSTETRICS AND GYNECOLOGY | Facility: CLINIC | Age: 29
End: 2019-02-14

## 2019-02-14 NOTE — TELEPHONE ENCOUNTER
PATIENT CALLED ASKING ABOUT IF SHE SHOULD HAVE THE HPV VACCINE?  SOMEONE TOLD HER SHE SHOULD GET IT IF SHE HAS NOT ALREADY TO PREVENT CANCER. HER NUMBER [phone].  SHE IS A LONG TIME PATIENT . THANKS KR        Patient's call was returned.  She was informed that we had the vaccine and that she could receive it here.

## 2019-02-20 ENCOUNTER — CLINICAL SUPPORT (OUTPATIENT)
Dept: OBSTETRICS AND GYNECOLOGY | Facility: CLINIC | Age: 29
End: 2019-02-20

## 2019-02-20 PROCEDURE — 90649 4VHPV VACCINE 3 DOSE IM: CPT | Performed by: OBSTETRICS & GYNECOLOGY

## 2019-02-20 PROCEDURE — 90471 IMMUNIZATION ADMIN: CPT | Performed by: OBSTETRICS & GYNECOLOGY

## 2019-04-22 ENCOUNTER — CLINICAL SUPPORT (OUTPATIENT)
Dept: OBSTETRICS AND GYNECOLOGY | Facility: CLINIC | Age: 29
End: 2019-04-22

## 2019-04-22 DIAGNOSIS — Z23 NEED FOR HPV VACCINATION: Primary | ICD-10-CM

## 2019-04-22 PROCEDURE — 90651 9VHPV VACCINE 2/3 DOSE IM: CPT | Performed by: OBSTETRICS & GYNECOLOGY

## 2019-04-22 PROCEDURE — 90471 IMMUNIZATION ADMIN: CPT | Performed by: OBSTETRICS & GYNECOLOGY

## 2019-05-10 ENCOUNTER — LAB (OUTPATIENT)
Dept: LAB | Facility: HOSPITAL | Age: 29
End: 2019-05-10

## 2019-05-10 DIAGNOSIS — L70.8 OTHER ACNE: Primary | ICD-10-CM

## 2019-05-10 LAB — POTASSIUM BLD-SCNC: 4.1 MMOL/L (ref 3.5–5.2)

## 2019-05-10 PROCEDURE — 36415 COLL VENOUS BLD VENIPUNCTURE: CPT

## 2019-05-10 PROCEDURE — 84132 ASSAY OF SERUM POTASSIUM: CPT

## 2019-06-13 ENCOUNTER — OFFICE VISIT (OUTPATIENT)
Dept: RETAIL CLINIC | Facility: CLINIC | Age: 29
End: 2019-06-13

## 2019-06-13 VITALS
WEIGHT: 178 LBS | TEMPERATURE: 98.4 F | SYSTOLIC BLOOD PRESSURE: 102 MMHG | HEART RATE: 90 BPM | RESPIRATION RATE: 18 BRPM | HEIGHT: 63 IN | DIASTOLIC BLOOD PRESSURE: 76 MMHG | OXYGEN SATURATION: 98 % | BODY MASS INDEX: 31.54 KG/M2

## 2019-06-13 DIAGNOSIS — H60.331 ACUTE SWIMMER'S EAR OF RIGHT SIDE: Primary | ICD-10-CM

## 2019-06-13 PROCEDURE — 99213 OFFICE O/P EST LOW 20 MIN: CPT | Performed by: NURSE PRACTITIONER

## 2019-06-13 RX ORDER — OFLOXACIN 3 MG/ML
5 SOLUTION AURICULAR (OTIC) DAILY
Qty: 5 ML | Refills: 0 | Status: SHIPPED | OUTPATIENT
Start: 2019-06-13 | End: 2019-06-20

## 2019-06-13 RX ORDER — AMOXICILLIN 875 MG/1
875 TABLET, COATED ORAL 2 TIMES DAILY
Qty: 20 TABLET | Refills: 0 | OUTPATIENT
Start: 2019-06-13 | End: 2020-03-08

## 2019-06-13 RX ORDER — LEVOCETIRIZINE DIHYDROCHLORIDE 5 MG/1
5 TABLET, FILM COATED ORAL EVERY EVENING
COMMUNITY
End: 2020-03-08

## 2019-06-13 RX ORDER — NEOMYCIN SULFATE, POLYMYXIN B SULFATE AND HYDROCORTISONE 10; 3.5; 1 MG/ML; MG/ML; [USP'U]/ML
SUSPENSION/ DROPS AURICULAR (OTIC)
Refills: 5 | COMMUNITY
Start: 2019-03-28 | End: 2019-06-13

## 2019-06-13 NOTE — PATIENT INSTRUCTIONS
"Otitis Externa  Otitis externa is an infection of the outer ear canal. The outer ear canal is the area between the outside of the ear and the eardrum. Otitis externa is sometimes called \"swimmer's ear.\"  Follow these instructions at home:  · If you were given antibiotic ear drops, use them as told by your doctor. Do not stop using them even if your condition gets better.  · Take over-the-counter and prescription medicines only as told by your doctor.  · Keep all follow-up visits as told by your doctor. This is important.  How is this prevented?  · Keep your ear dry. Use the corner of a towel to dry your ear after you swim or bathe.  · Try not to scratch or put things in your ear. Doing these things makes it easier for germs to grow in your ear.  · Avoid swimming in lakes, dirty water, or pools that may not have the right amount of a chemical called chlorine.  · Consider making ear drops and putting 3 or 4 drops in each ear after you swim. Ask your doctor about how you can make ear drops. Mix equal parts rubbing alcohol and white vinegar to make a 1:1 solution.   Contact a doctor if:  · You have a fever.  · After 3 days your ear is still red, swollen, or painful.  · After 3 days you still have pus coming from your ear.  · Your redness, swelling, or pain gets worse.  · You have a really bad headache.  · You have redness, swelling, pain, or tenderness behind your ear.  This information is not intended to replace advice given to you by your health care provider. Make sure you discuss any questions you have with your health care provider.  Document Released: 06/05/2009 Document Revised: 01/12/2017 Document Reviewed: 09/26/2016  [employer] Interactive Patient Education © 2019 [employer]    You may continue ibuprofen as needed per bottle instructions. Consider alternating ibuprofen with acetaminophen  Take medications as prescribed even if your symptoms improve  See primary care provider in 2 weeks, sooner if not " improving, you get worse or you have new symptoms  Warm, dry compress may be used to help with pain

## 2019-06-13 NOTE — PROGRESS NOTES
Subjective   Earache    Dulce Ospina is a 28 y.o. female.     Earache    There is pain in the right ear. This is a new problem. Episode onset: 4 days. The problem occurs constantly. The problem has been gradually worsening. There has been no fever. The pain is at a severity of 7/10. Associated symptoms include ear discharge and headaches. Pertinent negatives include no coughing, neck pain, rash, rhinorrhea or sore throat. She has tried NSAIDs for the symptoms. The treatment provided mild relief.        History Obtained from: Patient    Past Medical History:   Diagnosis Date   • Adult acne    • Hidradenitis    • IBS (irritable bowel syndrome)    • MRSA (methicillin resistant Staphylococcus aureus)    • Overweight      Past Surgical History:   Procedure Laterality Date   • TONSILLECTOMY       Social History     Tobacco Use   • Smoking status: Never Smoker   • Smokeless tobacco: Never Used   Substance Use Topics   • Alcohol use: Yes     Comment: social   • Drug use: No     Family History   Problem Relation Age of Onset   • No Known Problems Mother    • Hemophilia Father    • Hepatitis Father      Allergies   Allergen Reactions   • Keflex [Cephalexin] Rash   • Prednisone Rash   • Procardia [Nifedipine] Palpitations   • Sulfa Antibiotics Rash     Current Outpatient Medications   Medication Sig Dispense Refill   • drospirenone-ethinyl estradiol (BOB) 3-0.02 MG per tablet Take 1 tablet by mouth Daily. 28 tablet 12   • ibuprofen (ADVIL,MOTRIN) 800 MG tablet Take 1 tablet by mouth Every 6 (Six) Hours As Needed for Moderate Pain . 30 tablet 0   • levocetirizine (XYZAL) 5 MG tablet Take 5 mg by mouth Every Evening.     • spironolactone (ALDACTONE) 50 MG tablet Take 50 mg by mouth Daily.     • amoxicillin (AMOXIL) 875 MG tablet Take 1 tablet by mouth 2 (Two) Times a Day. 20 tablet 0   • clindamycin (CLEOCIN) 300 MG capsule Take 1 capsule by mouth 4 (Four) Times a Day. 40 capsule 0   • HYDROCHLOROTHIAZIDE PO Take 12.5 mg  "by mouth Daily.     • levonorgestrel (MIRENA) 20 MCG/24HR IUD 1 each by Intrauterine route 1 (One) Time.     • loratadine (CLARITIN) 10 MG tablet Take 1 tablet by mouth Daily.     • mupirocin (BACTROBAN) 2 % ointment Apply  topically to the appropriate area as directed 3 (Three) Times a Day. 15 g 0   • ofloxacin (FLOXIN) 0.3 % otic solution Administer 5 drops to the right ear Daily for 7 days. 5 mL 0   • phentermine (ADIPEX-P) 37.5 MG tablet Take 37.5 mg by mouth.       No current facility-administered medications for this visit.         The following portions of the patient's history were reviewed and updated as appropriate: allergies, current medications, past family history, past medical history, past social history and past surgical history.    Review of Systems   Constitutional: Positive for chills. Negative for fatigue and fever.   HENT: Positive for ear discharge and ear pain. Negative for congestion, rhinorrhea, sinus pressure and sore throat.    Eyes: Negative.    Respiratory: Negative for cough, shortness of breath and wheezing.    Cardiovascular: Negative.    Gastrointestinal: Negative.    Endocrine: Negative for polydipsia, polyphagia and polyuria.   Musculoskeletal: Negative for neck pain and neck stiffness.   Skin: Negative for rash and wound.   Allergic/Immunologic: Positive for environmental allergies. Negative for immunocompromised state.   Neurological: Positive for headaches. Negative for dizziness.   Hematological: Negative.    Psychiatric/Behavioral: Positive for sleep disturbance (due to pain). Negative for agitation.       Objective     VITAL SIGNS:   Vitals:    06/13/19 0957   BP: 102/76   BP Location: Left arm   Patient Position: Sitting   Pulse: 90   Resp: 18   Temp: 98.4 °F (36.9 °C)   TempSrc: Oral   SpO2: 98%   Weight: 80.7 kg (178 lb)   Height: 160 cm (63\")   PainSc:   7   PainLoc: Ear  Comment: right   Body mass index is 31.53 kg/m².    Physical Exam   Constitutional:  Non-toxic " appearance. No distress.   HENT:   Head: Atraumatic.   Right Ear: External ear normal. There is swelling and tenderness (to palpation tragus). Tympanic membrane is scarred (? small scar right 4:00 position), erythematous and bulging.   Left Ear: Tympanic membrane, external ear and ear canal normal.   Nose: Mucosal edema (right sided, mild) present. No nasal deformity. Right sinus exhibits no maxillary sinus tenderness and no frontal sinus tenderness. Left sinus exhibits no maxillary sinus tenderness and no frontal sinus tenderness.   Mouth/Throat: Uvula is midline. No uvula swelling.   Tonsils are surgically absent     Eyes: Lids are normal. No scleral icterus. Right pupil is round. Left pupil is round. Pupils are equal.   Neck: Normal range of motion and phonation normal. Neck supple. No tracheal deviation present.   Cardiovascular: Normal rate and regular rhythm.   Pulmonary/Chest: Effort normal and breath sounds normal.   Neurological: She is alert.   Skin: Skin is warm and dry.   Psychiatric: She is attentive.           Assessment/Plan     Dulce was seen today for earache.    Diagnoses and all orders for this visit:    Acute swimmer's ear of right side    Other orders  -     amoxicillin (AMOXIL) 875 MG tablet; Take 1 tablet by mouth 2 (Two) Times a Day.  -     ofloxacin (FLOXIN) 0.3 % otic solution; Administer 5 drops to the right ear Daily for 7 days.      PLAN:  OTC pain medications per bottle instructions. Patient should follow up with primary care provider in 2 weeks or sooner if symptoms fail to improve, worsen or for the development of new symptoms that need attention.    The patient voiced understanding and agreement to the patient treatment plan and instructions       GAVIN Lind

## 2019-12-11 ENCOUNTER — TELEPHONE (OUTPATIENT)
Dept: OBSTETRICS AND GYNECOLOGY | Facility: CLINIC | Age: 29
End: 2019-12-11

## 2019-12-11 DIAGNOSIS — Z30.09 COUNSELING FOR BIRTH CONTROL, ORAL CONTRACEPTIVES: ICD-10-CM

## 2019-12-11 RX ORDER — DROSPIRENONE AND ETHINYL ESTRADIOL 0.02-3(28)
1 KIT ORAL DAILY
Qty: 28 TABLET | Refills: 0 | Status: SHIPPED | OUTPATIENT
Start: 2019-12-11 | End: 2020-01-13

## 2019-12-11 NOTE — TELEPHONE ENCOUNTER
Patient is due for her annual so I will send in a month supply and she needs to call and schedule her annual. E-Rx sent to [facility] in [place]

## 2020-01-13 DIAGNOSIS — Z30.09 COUNSELING FOR BIRTH CONTROL, ORAL CONTRACEPTIVES: ICD-10-CM

## 2020-01-13 RX ORDER — DROSPIRENONE AND ETHINYL ESTRADIOL TABLETS 0.02-3(28)
KIT ORAL
Qty: 28 TABLET | Refills: 0 | Status: SHIPPED | OUTPATIENT
Start: 2020-01-13 | End: 2020-02-06

## 2020-01-13 NOTE — TELEPHONE ENCOUNTER
Dr. De Leon Pt    Pt has annual on 03/05/20 and requests refills on Krys be sent to [facility] [address].    Callback [phone]  [facility] [address]

## 2020-02-06 DIAGNOSIS — Z30.09 COUNSELING FOR BIRTH CONTROL, ORAL CONTRACEPTIVES: ICD-10-CM

## 2020-03-04 ENCOUNTER — TELEPHONE (OUTPATIENT)
Dept: OBSTETRICS AND GYNECOLOGY | Facility: CLINIC | Age: 30
End: 2020-03-04

## 2020-03-04 DIAGNOSIS — Z30.09 COUNSELING FOR BIRTH CONTROL, ORAL CONTRACEPTIVES: ICD-10-CM

## 2020-03-04 RX ORDER — DROSPIRENONE AND ETHINYL ESTRADIOL 0.02-3(28)
1 KIT ORAL DAILY
Qty: 28 TABLET | Refills: 0 | Status: SHIPPED | OUTPATIENT
Start: 2020-03-04 | End: 2020-03-31 | Stop reason: SDUPTHER

## 2020-03-04 NOTE — TELEPHONE ENCOUNTER
Patient has an appointment with Dr. Martinez on 03/19/2020. I will send in one refill to [facility] in [place]. E-Rx sent

## 2020-03-04 NOTE — TELEPHONE ENCOUNTER
Dr Martinez patient called in for a refill of her birth control to be called in to [facility] [address].     Thank you.

## 2020-03-08 ENCOUNTER — HOSPITAL ENCOUNTER (EMERGENCY)
Facility: HOSPITAL | Age: 30
Discharge: HOME | End: 2020-03-08
Attending: EMERGENCY MEDICINE | Admitting: EMERGENCY MEDICINE

## 2020-03-08 VITALS
WEIGHT: 181 LBS | OXYGEN SATURATION: 99 % | TEMPERATURE: 98.3 F | BODY MASS INDEX: 32.07 KG/M2 | DIASTOLIC BLOOD PRESSURE: 80 MMHG | RESPIRATION RATE: 16 BRPM | HEIGHT: 63 IN | HEART RATE: 80 BPM | SYSTOLIC BLOOD PRESSURE: 122 MMHG

## 2020-03-08 DIAGNOSIS — R22.0 FACIAL SWELLING: ICD-10-CM

## 2020-03-08 DIAGNOSIS — T78.40XA ACUTE ALLERGIC REACTION, INITIAL ENCOUNTER: Primary | ICD-10-CM

## 2020-03-08 DIAGNOSIS — Z88.9 HISTORY OF SEASONAL ALLERGIES: ICD-10-CM

## 2020-03-08 LAB
HOLD SPECIMEN: NORMAL
HOLD SPECIMEN: NORMAL
WHOLE BLOOD HOLD SPECIMEN: NORMAL
WHOLE BLOOD HOLD SPECIMEN: NORMAL

## 2020-03-08 PROCEDURE — 25010000002 DEXAMETHASONE PER 1 MG: Performed by: PHYSICIAN ASSISTANT

## 2020-03-08 PROCEDURE — 25010000002 DIPHENHYDRAMINE PER 50 MG: Performed by: PHYSICIAN ASSISTANT

## 2020-03-08 PROCEDURE — 96375 TX/PRO/DX INJ NEW DRUG ADDON: CPT

## 2020-03-08 PROCEDURE — 99283 EMERGENCY DEPT VISIT LOW MDM: CPT

## 2020-03-08 PROCEDURE — 96374 THER/PROPH/DIAG INJ IV PUSH: CPT

## 2020-03-08 RX ORDER — DEXAMETHASONE 4 MG/1
4 TABLET ORAL 2 TIMES DAILY WITH MEALS
Qty: 8 TABLET | Refills: 0 | Status: SHIPPED | OUTPATIENT
Start: 2020-03-08 | End: 2020-03-13

## 2020-03-08 RX ORDER — DIPHENHYDRAMINE HYDROCHLORIDE 50 MG/ML
25 INJECTION INTRAMUSCULAR; INTRAVENOUS ONCE
Status: COMPLETED | OUTPATIENT
Start: 2020-03-08 | End: 2020-03-08

## 2020-03-08 RX ORDER — SODIUM CHLORIDE 0.9 % (FLUSH) 0.9 %
10 SYRINGE (ML) INJECTION AS NEEDED
Status: DISCONTINUED | OUTPATIENT
Start: 2020-03-08 | End: 2020-03-09 | Stop reason: HOSPADM

## 2020-03-08 RX ORDER — FAMOTIDINE 20 MG/1
20 TABLET, FILM COATED ORAL 2 TIMES DAILY
Qty: 14 TABLET | Refills: 0 | Status: SHIPPED | OUTPATIENT
Start: 2020-03-08 | End: 2020-06-01

## 2020-03-08 RX ORDER — DEXAMETHASONE IN 0.9 % SOD CHL 10 MG/50ML
10 INTRAVENOUS SOLUTION, PIGGYBACK (ML) INTRAVENOUS ONCE
Status: COMPLETED | OUTPATIENT
Start: 2020-03-08 | End: 2020-03-08

## 2020-03-08 RX ORDER — FAMOTIDINE 10 MG/ML
20 INJECTION, SOLUTION INTRAVENOUS ONCE
Status: COMPLETED | OUTPATIENT
Start: 2020-03-08 | End: 2020-03-08

## 2020-03-08 RX ADMIN — DEXAMETHASONE SODIUM PHOSPHATE 10 MG: 10 INJECTION INTRAMUSCULAR; INTRAVENOUS at 21:58

## 2020-03-08 RX ADMIN — FAMOTIDINE 20 MG: 10 INJECTION INTRAVENOUS at 21:43

## 2020-03-08 RX ADMIN — SODIUM CHLORIDE 1000 ML: 9 INJECTION, SOLUTION INTRAVENOUS at 21:41

## 2020-03-08 RX ADMIN — DIPHENHYDRAMINE HYDROCHLORIDE 25 MG: 50 INJECTION, SOLUTION INTRAMUSCULAR; INTRAVENOUS at 21:42

## 2020-03-09 NOTE — DISCHARGE INSTRUCTIONS
Patient having an acute allergic reaction with unknown etiology.  She was given IV fluids with Decadron, Benadryl, and Pepcid.  Rx for dexamethasone 4 mg by mouth twice daily x4 days, as well as Pepcid 20 mg by mouth twice daily.  Recommend Benadryl 25 mg by mouth every 4-6 hours as needed for itching or swelling.  If patient is working, she may take Zyrtec 10 mg daily or Claritin 10 mg daily instead of Benadryl.  Recommend close PCP follow-up for recheck.  Return to the ER if worsening symptoms.

## 2020-03-09 NOTE — ED PROVIDER NOTES
Subjective   Dulce Ospina is a 29 y.o. female who presents to the ED with c/o allergic reaction. The patient was cleaning her home today around 1200 with lemon Lysol, which she regularly uses, when she had sudden onset of swelling surrounding her right eye. Since onset she has had gradual worsening facial swelling for which she took 25 mg of Benadryl but it was ineffective. Around 1945 her throat began to feel swollen, prompting her to present to the ED for evaluation. The patient has seasonal allergies for which she takes Benadryl as needed for her symptoms but she does not have any food allergies. She complains of her face feeling puffy but denies itchiness and shortness of breath.  Patient has no other skin lesions or rash.  The patient's last normal menstrual period is currently ongoing. She has a past medical history of irritable bowel syndrome, MRSA, and hidradenitis. There are no other acute complaints at this time.      History provided by:  Patient  Allergic Reaction   Presenting symptoms: difficulty swallowing and swelling    Presenting symptoms: no difficulty breathing, no itching, no rash and no wheezing    Severity:  Moderate  Duration:  9 hours  Prior allergic episodes:  Seasonal allergies  Context comment:  The patient was cleaning with supplies she normally uses  Relieved by:  Nothing  Worsened by:  Nothing  Ineffective treatments:  Rest (Benadryl)      Review of Systems   Constitutional: Negative for chills and fever.   HENT: Positive for facial swelling and trouble swallowing.         The patient complains of facial swelling and puffiness. She complains of throat swelling. She denies itchiness or erythema.   Respiratory: Negative for apnea, cough, choking, chest tightness, shortness of breath and wheezing.    Cardiovascular: Negative for chest pain and leg swelling.   Gastrointestinal: Negative for nausea and vomiting.   Skin: Negative for color change, itching, pallor and rash.    Neurological: Negative for syncope, weakness and headaches.   All other systems reviewed and are negative.      Past Medical History:   Diagnosis Date   • Adult acne    • Hidradenitis    • IBS (irritable bowel syndrome)    • MRSA (methicillin resistant Staphylococcus aureus)    • Overweight        Allergies   Allergen Reactions   • Keflex [Cephalexin] Rash   • Prednisone Rash   • Procardia [Nifedipine] Palpitations   • Sulfa Antibiotics Rash       Past Surgical History:   Procedure Laterality Date   • TONSILLECTOMY         Family History   Problem Relation Age of Onset   • No Known Problems Mother    • Hemophilia Father    • Hepatitis Father        Social History     Socioeconomic History   • Marital status: Single     Spouse name: Not on file   • Number of children: Not on file   • Years of education: Not on file   • Highest education level: Not on file   Tobacco Use   • Smoking status: Never Smoker   • Smokeless tobacco: Never Used   Substance and Sexual Activity   • Alcohol use: Yes     Comment: social   • Drug use: No   • Sexual activity: Yes     Birth control/protection: Implant         Objective   Physical Exam   Constitutional: She is oriented to person, place, and time. She appears well-developed and well-nourished. No distress.   HENT:   Head: Normocephalic and atraumatic. Head is without right periorbital erythema and without left periorbital erythema.   Mouth/Throat: Oropharynx is clear and moist.   The patient has some mild swelling below the eyes bilaterally but there is no significant periorbital edema.  Oropharynx is benign.   Eyes: Conjunctivae are normal. No scleral icterus.   Neck: Normal range of motion. Neck supple.   Cardiovascular: Regular rhythm and normal heart sounds. Tachycardia present.   No murmur heard.  Mild tachycardia.   Pulmonary/Chest: Effort normal and breath sounds normal. No respiratory distress. She has no wheezes. She has no rhonchi. She has no rales.   Lungs are clear to  auscultation.   Abdominal: Soft. There is no tenderness.   Musculoskeletal: Normal range of motion. She exhibits no edema.   Neurological: She is alert and oriented to person, place, and time.   Skin: Skin is warm and dry. No lesion and no rash noted. No erythema.   There is mild swelling below both eyes but there is no facial erythema, no angioedema of the lips or tongue, and no other skin lesions or rashes.   Psychiatric: She has a normal mood and affect. Her behavior is normal.   Nursing note and vitals reviewed.      Procedures         ED Course  ED Course as of Mar 08 2241   Sun Mar 08, 2020   2143 Patient lists oral prednisone as an allergy and says that this causes some facial swelling.  She says that she can tolerate other steroids.    [FC]   2234 Patient's vitals were stable, including O2 sat at 100% on room air.  Oropharynx is patent.  Patient has minimal swelling below both eyes but no significant periorbital edema or other skin lesions/rash.  We will prescribe Pepcid 20 mg by mouth twice daily on discharge, and recommend patient to utilize Benadryl 25 mg by mouth every 4-6 hours as needed for itching.  I will also prescribe a DexPak on discharge.  Return to the ER if worsening symptoms.    [FC]      ED Course User Index  [FC] Zonia Massey, ROXANE     Recent Results (from the past 24 hour(s))   Light Blue Top    Collection Time: 03/08/20  9:03 PM   Result Value Ref Range    Extra Tube hold for add-on    Green Top (Gel)    Collection Time: 03/08/20  9:03 PM   Result Value Ref Range    Extra Tube Hold for add-ons.    Lavender Top    Collection Time: 03/08/20  9:03 PM   Result Value Ref Range    Extra Tube hold for add-on    Gold Top - SST    Collection Time: 03/08/20  9:03 PM   Result Value Ref Range    Extra Tube Hold for add-ons.      Note: In addition to lab results from this visit, the labs listed above may include labs taken at another facility or during a different encounter within the last 24 hours.  "Please correlate lab times with ED admission and discharge times for further clarification of the services performed during this visit.    No orders to display     Vitals:    03/08/20 2042 03/08/20 2052 03/08/20 2158   BP: 131/90  116/78   BP Location: Left arm     Patient Position: Sitting     Pulse: 107  83   Resp: 20     Temp: 98.3 °F (36.8 °C)     TempSrc: Oral     SpO2: 98%  100%   Weight:  82.1 kg (181 lb)    Height:  160 cm (63\")      Medications   sodium chloride 0.9 % flush 10 mL (has no administration in time range)   sodium chloride 0.9 % bolus 1,000 mL (1,000 mL Intravenous New Bag 3/8/20 2141)   diphenhydrAMINE (BENADRYL) injection 25 mg (25 mg Intravenous Given 3/8/20 2142)   famotidine (PEPCID) injection 20 mg (20 mg Intravenous Given 3/8/20 2143)   Dexamethasone Sod Phos-NaCl 10-0.9 MG/50ML-% IVPB solution 10 mg (10 mg Intravenous Given 3/8/20 2158)     ECG/EMG Results (last 24 hours)     ** No results found for the last 24 hours. **        No orders to display                                              MDM    Final diagnoses:   Acute allergic reaction, initial encounter   Facial swelling   History of seasonal allergies       Documentation assistance provided by arnold Bernal.  Information recorded by the arnold was done at my direction and has been verified and validated by me.     Ankush Bernal  03/08/20 2133       Zonia Massey PA-C  03/08/20 2241    "

## 2020-03-23 ENCOUNTER — TELEPHONE (OUTPATIENT)
Dept: OBSTETRICS AND GYNECOLOGY | Facility: CLINIC | Age: 30
End: 2020-03-23

## 2020-03-23 RX ORDER — FLUCONAZOLE 150 MG/1
150 TABLET ORAL DAILY
Qty: 1 TABLET | Refills: 1 | Status: SHIPPED | OUTPATIENT
Start: 2020-03-23 | End: 2020-06-01

## 2020-03-31 DIAGNOSIS — Z30.09 COUNSELING FOR BIRTH CONTROL, ORAL CONTRACEPTIVES: ICD-10-CM

## 2020-03-31 RX ORDER — DROSPIRENONE AND ETHINYL ESTRADIOL 0.02-3(28)
1 KIT ORAL DAILY
Qty: 28 TABLET | Refills: 0 | Status: SHIPPED | OUTPATIENT
Start: 2020-03-31 | End: 2020-04-02 | Stop reason: SDUPTHER

## 2020-04-02 ENCOUNTER — TELEPHONE (OUTPATIENT)
Dept: OBSTETRICS AND GYNECOLOGY | Facility: CLINIC | Age: 30
End: 2020-04-02

## 2020-04-02 DIAGNOSIS — Z30.09 COUNSELING FOR BIRTH CONTROL, ORAL CONTRACEPTIVES: ICD-10-CM

## 2020-04-02 RX ORDER — DROSPIRENONE AND ETHINYL ESTRADIOL 0.02-3(28)
1 KIT ORAL DAILY
Qty: 28 TABLET | Refills: 2 | Status: SHIPPED | OUTPATIENT
Start: 2020-04-02 | End: 2020-06-12 | Stop reason: SDUPTHER

## 2020-04-02 NOTE — TELEPHONE ENCOUNTER
Patient has an appointment on 06/12/2020 so I will send in enough refills until her appointment. E-Rx Gianvi #[id number] with 2 refills has been sent to patient's pharmacy.

## 2020-04-02 NOTE — TELEPHONE ENCOUNTER
Dr Fernandez    Patient had to reschedule her annual until 6/12/2020 so she needs 2 refills on her birth control pills.    Pt call back [phone]    [facility] [phone]

## 2020-05-31 ENCOUNTER — APPOINTMENT (OUTPATIENT)
Dept: GENERAL RADIOLOGY | Facility: HOSPITAL | Age: 30
End: 2020-05-31

## 2020-05-31 ENCOUNTER — HOSPITAL ENCOUNTER (EMERGENCY)
Facility: HOSPITAL | Age: 30
Discharge: LEFT BEFORE DISCHARGE | End: 2020-05-31

## 2020-05-31 VITALS
WEIGHT: 185 LBS | BODY MASS INDEX: 32.78 KG/M2 | RESPIRATION RATE: 16 BRPM | OXYGEN SATURATION: 100 % | HEIGHT: 63 IN | SYSTOLIC BLOOD PRESSURE: 127 MMHG | HEART RATE: 97 BPM | DIASTOLIC BLOOD PRESSURE: 90 MMHG | TEMPERATURE: 98.7 F

## 2020-06-01 NOTE — ED NOTES
"Pt approached info desk and stated she was \"Just gonna go home, I'm tired, and will go to [facility] tomorrow.\" Triage RN notified.      Erin Dawn, PCT  05/31/20 7931    "

## 2020-06-11 ENCOUNTER — OFFICE VISIT (OUTPATIENT)
Dept: ORTHOPEDIC SURGERY | Facility: CLINIC | Age: 30
End: 2020-06-11

## 2020-06-11 VITALS — WEIGHT: 184.97 LBS | BODY MASS INDEX: 32.77 KG/M2 | OXYGEN SATURATION: 97 % | HEIGHT: 63 IN | HEART RATE: 88 BPM

## 2020-06-11 DIAGNOSIS — M77.8 RIGHT HAND TENDONITIS: Primary | ICD-10-CM

## 2020-06-11 PROCEDURE — 99213 OFFICE O/P EST LOW 20 MIN: CPT | Performed by: PHYSICIAN ASSISTANT

## 2020-06-11 RX ORDER — MELOXICAM 15 MG/1
15 TABLET ORAL DAILY
Qty: 30 TABLET | Refills: 2 | Status: SHIPPED | OUTPATIENT
Start: 2020-06-11 | End: 2022-06-06

## 2020-06-11 NOTE — PROGRESS NOTES
[facility] Note    Subjective     Patient: Dulce Ospina  : 1990    Primary Care Provider: Provider, No Known    Requesting Provider: As above    Pain of the Right Wrist      History    Chief Complaint: Right wrist and hand pain    History of Present Illness: Patient presents today with a new problem.  She reports 4 to 5-week history of right dorsal wrist and hand pain.  She is RHD She denies any trauma or injury.  She has pain that is 7/10 and stabbing.  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  It bothers her with gripping and lifting.  It bothers her with extension of the wrist and index finger.  She works in [employer] for [employer].  She denies any swelling warmth or erythema.  She is treated with Tylenol and ibuprofen.  She is here for further evaluation and treatment recommendations.    Current Outpatient Medications on File Prior to Visit   Medication Sig Dispense Refill   • ibuprofen (ADVIL,MOTRIN) 800 MG tablet Take 1 tablet by mouth 3 (Three) Times a Day With Meals. 60 tablet 0   • phentermine (ADIPEX-P) 37.5 MG tablet Take 37.5 mg by mouth.     • spironolactone (ALDACTONE) 50 MG tablet Take 50 mg by mouth Daily.     • [DISCONTINUED] drospirenone-ethinyl estradiol (Gianvi) 3-0.02 MG per tablet Take 1 tablet by mouth Daily. 28 tablet 2     No current facility-administered medications on file prior to visit.       Allergies   Allergen Reactions   • Cephalexin Rash   • Nifedipine Palpitations   • Prednisone Rash   • Sulfa Antibiotics Rash   • Sulfamethoxazole-Trimethoprim Hives      Past Medical History:   Diagnosis Date   • Adult acne    • Hidradenitis    • IBS (irritable bowel syndrome)    • MRSA (methicillin resistant Staphylococcus aureus)    • Overweight      Past Surgical History:   Procedure Laterality Date   • TONSILLECTOMY     • WISDOM TOOTH EXTRACTION       Family History   Problem Relation Age of Onset   • No Known  "Problems Mother    • Hemophilia Father    • Hepatitis Father       Social History     Socioeconomic History   • Marital status: Single     Spouse name: Not on file   • Number of children: Not on file   • Years of education: Not on file   • Highest education level: Not on file   Tobacco Use   • Smoking status: Never Smoker   • Smokeless tobacco: Never Used   Substance and Sexual Activity   • Alcohol use: Yes     Frequency: 2-4 times a month     Comment: social   • Drug use: No   • Sexual activity: Yes     Partners: Male     Birth control/protection: OCP, Pill        Review of Systems   Constitutional: Negative.    HENT: Negative.    Eyes: Negative.    Respiratory: Negative.    Cardiovascular: Negative.    Gastrointestinal: Negative.    Endocrine: Negative.    Genitourinary: Negative.    Musculoskeletal: Positive for arthralgias.   Skin: Negative.    Allergic/Immunologic: Negative.    Neurological: Negative.    Hematological: Negative.    Psychiatric/Behavioral: Negative.        The following portions of the patient's history were reviewed and updated as appropriate: allergies, current medications, past family history, past medical history, past social history, past surgical history and problem list.      Objective      Physical Exam  Pulse 88   Ht 160 cm (62.99\")   Wt 83.9 kg (184 lb 15.5 oz)   LMP 05/26/2020 (Approximate)   SpO2 97%   BMI 32.77 kg/m²     Body mass index is 32.77 kg/m².    GENERAL: Body habitus: obese    Gait: normal     Mental Status:  awake and alert; oriented to person, place, and time    Voice:  clear  SKIN:  Normal    Hair Growth:  Right:normal; Left:  normal  HEENT: Head: Normocephalic, atraumatic,  without obvious abnormality.  eye: normal external eye, no icterus   PULM:  Repiratory effort normal    Ortho Exam  Peripheral Vascular   Bilateral Upper Extremity    No cyanotic nail beds    Pink nail beds and rapid capillary refill   Palpation    Radial Pulse - Bilaterally " normal    Neurologic   Sensory: Light touch intact- Right hand       Right Upper Extremity    Right wrist extensors: 5/5    Right wrist flexors: 5/5    Right intrinsics: 5/5    Musculoskeletal      Right Elbow    Forearm supination: AROM - 90 degrees    Forearm pronation: AROM - 90 degrees     Inspection and Palpation   Right Wrist      Tenderness - mildly tender at the wrist joint    Swelling - none    Crepitus - none    Muscle tone - no atrophy        ROJM:      Right Wrist    Flexion: AROM - 90 degrees    Extension: AROM - 90 degrees     Deformities, Malalignments, Discrepancies    None     Functional Testing   Right Wrist    Tinel's Sign-- negative    Phalen's Sign-- negative    Carpal Compression Test-- negative    Finklestein's Test-- negative    Thumb CMC joint--negative       Strength and Tone    Right  strength: good         Hand Exam:      Patient has dorsal hand and wrist pain with resisted extension of the index finger.    Full range of motion of the thumb MCPJ and IPJ right    Full range of motion of the index finger MCPJ, PIPJ and DIPJ  right    Full range of motion of the middle finger MCPJ, PIPJ and DIPJ right    Full range of motion of the ring finger MCPJ, PIPJ and DIPJ right    Full range of motion of the small finger MCPJ, PIPJ and DIPJ right    FDS, FDP and extensor tendons are intact in the index, middle, ring and small fingers right    FPJ and extensor tendons are intact in the thumb.    No palpable triggering          Medical Decision Making    Data Review:   ordered and reviewed x-rays today    Assessment:  1. Right hand tendonitis        Plan:  Right extensor tendinitis.  I reviewed today's x-rays and clinical findings with the patient.  X-rays today show no evidence of acute or chronic bony findings.  Patient has pain with resisted extension of the index finger.  She works at a computer all day on a mouse.  I suspect this is an overuse injury secondary to using a mouse.  We discussed  further treatment options including bracing, activity modification, changing her mouse to her left hand or some other ergonomic way of adjusting her computer.  I have given her prescription for Mobic.  She will return in 1 month to see how she is progressing consider MRI at that time if indicated.    History, diagnosis and treatment plan discussed with Dr. Flores.        Sarah Wlicox PA-C  06/12/20  12:20

## 2020-06-12 ENCOUNTER — OFFICE VISIT (OUTPATIENT)
Dept: OBSTETRICS AND GYNECOLOGY | Facility: CLINIC | Age: 30
End: 2020-06-12

## 2020-06-12 VITALS
WEIGHT: 181 LBS | SYSTOLIC BLOOD PRESSURE: 98 MMHG | DIASTOLIC BLOOD PRESSURE: 66 MMHG | BODY MASS INDEX: 33.31 KG/M2 | RESPIRATION RATE: 14 BRPM | HEIGHT: 62 IN

## 2020-06-12 DIAGNOSIS — Z01.419 WELL WOMAN EXAM WITH ROUTINE GYNECOLOGICAL EXAM: ICD-10-CM

## 2020-06-12 DIAGNOSIS — R30.0 DYSURIA: ICD-10-CM

## 2020-06-12 DIAGNOSIS — Z30.41 ENCOUNTER FOR BIRTH CONTROL PILLS MAINTENANCE: ICD-10-CM

## 2020-06-12 DIAGNOSIS — Z11.3 SCREEN FOR STD (SEXUALLY TRANSMITTED DISEASE): ICD-10-CM

## 2020-06-12 DIAGNOSIS — Z01.419 WELL WOMAN EXAM WITH ROUTINE GYNECOLOGICAL EXAM: Primary | ICD-10-CM

## 2020-06-12 LAB
BILIRUB UR QL STRIP: NEGATIVE
CLARITY UR: CLEAR
COLOR UR: YELLOW
GLUCOSE UR STRIP-MCNC: NEGATIVE MG/DL
HGB UR QL STRIP.AUTO: NEGATIVE
KETONES UR QL STRIP: NEGATIVE
LEUKOCYTE ESTERASE UR QL STRIP.AUTO: NEGATIVE
NITRITE UR QL STRIP: NEGATIVE
PH UR STRIP.AUTO: 5.5 [PH] (ref 5–8)
PROT UR QL STRIP: NEGATIVE
SP GR UR STRIP: 1.02 (ref 1–1.03)
UROBILINOGEN UR QL STRIP: NORMAL

## 2020-06-12 PROCEDURE — 81003 URINALYSIS AUTO W/O SCOPE: CPT | Performed by: OBSTETRICS & GYNECOLOGY

## 2020-06-12 PROCEDURE — 99395 PREV VISIT EST AGE 18-39: CPT | Performed by: OBSTETRICS & GYNECOLOGY

## 2020-06-12 RX ORDER — CETIRIZINE HYDROCHLORIDE 10 MG/1
10 TABLET ORAL DAILY PRN
COMMUNITY

## 2020-06-12 RX ORDER — DROSPIRENONE AND ETHINYL ESTRADIOL 0.02-3(28)
1 KIT ORAL DAILY
Qty: 28 TABLET | Refills: 2 | Status: SHIPPED | OUTPATIENT
Start: 2020-06-12 | End: 2020-08-14 | Stop reason: SDUPTHER

## 2020-06-12 NOTE — PROGRESS NOTES
Subjective   Chief Complaint   Patient presents with   • Gynecologic Exam     Desires STD screen     Dulce Ospina is a 29 y.o. year old  presenting to be seen for her annual exam.  Patient presents for refill of birth control pills.  She is started having a sexual relationship with her ex- and wants STD screening.  She is having no GYN problems.  She has developed some dysuria and wants a urinary culture done to rule out UTI.  The clean-catch urinalysis was sent to the lab.  Patient otherwise is doing well.    SEXUAL Hx:  She is currently sexually active.  In the past year there has been new sexual partners.    Condoms ARE typically used.  She would like to be screened for STD's at today's exam.  Current birth control method: OCP (estrogen/progesterone).  She is happy with her current method of contraception and does not want to discuss alternative methods of contraception.  MENSTRUAL Hx:  Patient's last menstrual period was 2020 (approximate).  In the past 6 months her cycles have been regular, predictable and occur monthly.  Her menstrual flow is normal.   Each month on average there are roughly 0 day(s) of very heavy flow.    Intermenstrual bleeding is absent.    Post-coital bleeding is present.  Dysmenorrhea: is not affecting her activities of daily living  PMS: is not affecting her activities of daily living  Her cycles are not a source of concern for her that she wishes to discuss today.  HEALTH Hx:  She exercises regularly:no (but is planning to start exercising more ).  She wears her seat belt:yes.  She has concerns about domestic violence: no.  OTHER COMPLAINTS:  Nothing else    The following portions of the patient's history were reviewed and updated as appropriate:problem list, current medications, allergies, past family history, past medical history, past social history and past surgical history.    Social History    Tobacco Use      Smoking status: Never Smoker      Smokeless  "tobacco: Never Used    Review of Systems  Review of Systems - History obtained from the patient  General ROS: negative  Psychological ROS: negative  ENT ROS: negative  Allergy and Immunology ROS: positive for - seasonal allergies  Hematological and Lymphatic ROS: negative  Endocrine ROS: negative  Breast ROS: negative for breast lumps  Respiratory ROS: no cough, shortness of breath, or wheezing  Cardiovascular ROS: no chest pain or dyspnea on exertion  Gastrointestinal ROS: positive for - diarrhea  Genito-Urinary ROS: positive for - dysuria  Musculoskeletal ROS: negative  Neurological ROS: no TIA or stroke symptoms  Dermatological ROS: negative        Objective   BP 98/66   Resp 14   Ht 157.5 cm (62\")   Wt 82.1 kg (181 lb)   LMP 05/26/2020 (Approximate)   Breastfeeding No   BMI 33.11 kg/m²     General:  well developed; well nourished  no acute distress   Skin:  No suspicious lesions seen   Thyroid: not examined   Breasts:  Examined in supine position  Symmetric without masses or skin dimpling  Nipples normal without inversion, lesions or discharge  There are no palpable axillary nodes   Abdomen: soft, non-tender; no masses  no umbilical or inguinal hernias are present  no hepato-splenomegaly   Heart: regular rate and rhythm, S1, S2 normal, no murmur, click, rub or gallop   Lungs: clear to auscultation   Pelvis: Clinical staff was present for exam  External genitalia:  normal appearance of the external genitalia including Bartholin's and [place]'s glands.  :  urethral meatus normal;  Vaginal:  normal pink mucosa without prolapse or lesions.  Cervix:  normal appearance. Pap smear was done  Uterus:  normal size, shape and consistency.  Adnexa:  normal bimanual exam of the adnexa.  Rectal:  digital rectal exam not performed; anus visually normal appearing.          Assessment/Plan   Dulce was seen today for gynecologic exam.    Diagnoses and all orders for this visit:    Well woman exam with routine gynecological " exam  -     Pap IG, Rfx HPV ASCU; Future    Encounter for birth control pills maintenance  -     drospirenone-ethinyl estradiol (Gianvi) 3-0.02 MG per tablet; Take 1 tablet by mouth Daily.    Screen for STD (sexually transmitted disease)  -     Chlamydia trachomatis, Neisseria gonorrhoeae, Trichomonas vaginalis, PCR - Swab, Vagina; Future    Dysuria  -     Urinalysis With Culture If Indicated - Urine, Clean Catch         Return in 1 year  Lab results will be called the patient  This note was electronically signed.    Abiodun Martinez MD   June 12, 2020

## 2020-06-15 ENCOUNTER — TELEPHONE (OUTPATIENT)
Dept: OBSTETRICS AND GYNECOLOGY | Facility: CLINIC | Age: 30
End: 2020-06-15

## 2020-06-15 NOTE — TELEPHONE ENCOUNTER
Patient returned to telephone call.  She was told that her urinalysis was completely normal.    Abiodun Mratinez MD

## 2020-06-15 NOTE — TELEPHONE ENCOUNTER
Patient was called to discuss urinalysis results.  The test was normal.  There is no answer but a message was left to return the call.    Abiodun Martinez MD

## 2020-06-19 ENCOUNTER — TELEPHONE (OUTPATIENT)
Dept: OBSTETRICS AND GYNECOLOGY | Facility: CLINIC | Age: 30
End: 2020-06-19

## 2020-06-19 NOTE — TELEPHONE ENCOUNTER
Patient was called and informed that her STD screening cultures were negative.    Abiodun Martinez MD

## 2020-06-24 ENCOUNTER — TELEPHONE (OUTPATIENT)
Dept: OBSTETRICS AND GYNECOLOGY | Facility: CLINIC | Age: 30
End: 2020-06-24

## 2020-06-24 NOTE — TELEPHONE ENCOUNTER
Patient was called and informed that her Pap smear was read as ASCUS.  This was her first abnormal Pap smear by history.  She was told to repeat the Pap smear within 1 year.  HPV testing was done and was normal.    Abiodun Martinez MD

## 2020-08-14 ENCOUNTER — OFFICE VISIT (OUTPATIENT)
Dept: OBSTETRICS AND GYNECOLOGY | Facility: CLINIC | Age: 30
End: 2020-08-14

## 2020-08-14 VITALS
HEIGHT: 62 IN | BODY MASS INDEX: 32.76 KG/M2 | WEIGHT: 178 LBS | DIASTOLIC BLOOD PRESSURE: 72 MMHG | SYSTOLIC BLOOD PRESSURE: 104 MMHG | RESPIRATION RATE: 14 BRPM

## 2020-08-14 DIAGNOSIS — R82.90 BAD ODOR OF URINE: ICD-10-CM

## 2020-08-14 DIAGNOSIS — N92.1 BREAKTHROUGH BLEEDING ON BIRTH CONTROL PILLS: Primary | ICD-10-CM

## 2020-08-14 PROCEDURE — 87088 URINE BACTERIA CULTURE: CPT | Performed by: OBSTETRICS & GYNECOLOGY

## 2020-08-14 PROCEDURE — 81001 URINALYSIS AUTO W/SCOPE: CPT | Performed by: OBSTETRICS & GYNECOLOGY

## 2020-08-14 PROCEDURE — 87186 SC STD MICRODIL/AGAR DIL: CPT | Performed by: OBSTETRICS & GYNECOLOGY

## 2020-08-14 PROCEDURE — 99213 OFFICE O/P EST LOW 20 MIN: CPT | Performed by: OBSTETRICS & GYNECOLOGY

## 2020-08-14 PROCEDURE — 87086 URINE CULTURE/COLONY COUNT: CPT | Performed by: OBSTETRICS & GYNECOLOGY

## 2020-08-14 RX ORDER — DROSPIRENONE AND ETHINYL ESTRADIOL 0.02-3(28)
1 KIT ORAL DAILY
Qty: 28 TABLET | Refills: 12 | Status: SHIPPED | OUTPATIENT
Start: 2020-08-14 | End: 2021-08-20 | Stop reason: SDUPTHER

## 2020-08-14 NOTE — PROGRESS NOTES
Subjective   Dulce Ospina is a 29 y.o. female is here today for follow-up.    Chief Complaint   Patient presents with   • Follow-up     Patient is here today because she has been bleeding for 2 weeks with mild to moderate cramping. Patient states she has had this same issue last month.        History of Present Illness  Patient has developed breakthrough bleeding on the generic for Bob.  She gets a different generic every month from the pharmacy and for the last 2 months has had breakthrough bleeding.  The patient has not missed any birth control pills and her sexual partner has had a vasectomy.  The likelihood of undiagnosed pregnancy is low.  We will switch the generics by sending a new prescription for Bob to her pharmacy here.  She is also complaining of a foul odor of the urine.  A urinalysis was ordered.  Patient will be called the results of the UA.  She will report how the new birth control pill affects her abnormal bleeding.  The following portions of the patient's history were reviewed and updated as appropriate: allergies, current medications, past family history, past medical history, past social history, past surgical history and problem list.    Review of Systems - Negative except      Objective   General:  well developed; well nourished  no acute distress   Skin:  Not performed.   Thyroid: not examined   Breasts:  Not performed.   Abdomen: Not performed.   Heart: regular rate and rhythm, S1, S2 normal, no murmur, click, rub or gallop   Lungs: clear to auscultation   Pelvis: Not performed.         Assessment/Plan   Dulce was seen today for follow-up.    Diagnoses and all orders for this visit:    Breakthrough bleeding on birth control pills  -     drospirenone-ethinyl estradiol (BOB) 3-0.02 MG per tablet; Take 1 tablet by mouth Daily.    Bad odor of urine  -     Urinalysis With Culture If Indicated - Urine, Clean Catch; Future        Call if abnormal bleeding continues  UA results will be called  tomorrow    Abiodun Martinez MD

## 2020-08-15 ENCOUNTER — TELEPHONE (OUTPATIENT)
Dept: OBSTETRICS AND GYNECOLOGY | Facility: CLINIC | Age: 30
End: 2020-08-15

## 2020-08-15 LAB

## 2020-08-15 NOTE — TELEPHONE ENCOUNTER
Patient returned to telephone call and was informed that her urinalysis showed 2+ leukocyte Estrace and 13-20 WBCs.  Patient has no dysuria.  We will wait on the culture before treating.    Abiodun Martinez MD

## 2020-08-15 NOTE — TELEPHONE ENCOUNTER
Patient was called to discuss her urinalysis.  There was no answer but a message was left to return the call.    Abiodun Martinez MD

## 2020-08-17 ENCOUNTER — TELEPHONE (OUTPATIENT)
Dept: OBSTETRICS AND GYNECOLOGY | Facility: CLINIC | Age: 30
End: 2020-08-17

## 2020-08-17 DIAGNOSIS — N30.00 ACUTE CYSTITIS WITHOUT HEMATURIA: Primary | ICD-10-CM

## 2020-08-17 LAB — BACTERIA SPEC AEROBE CULT: ABNORMAL

## 2020-08-17 RX ORDER — NITROFURANTOIN 25; 75 MG/1; MG/1
100 CAPSULE ORAL 2 TIMES DAILY
Qty: 14 CAPSULE | Refills: 0 | Status: SHIPPED | OUTPATIENT
Start: 2020-08-17 | End: 2020-08-24

## 2020-08-17 NOTE — TELEPHONE ENCOUNTER
Patient was called and informed that her urinalysis showed a infection with Escherichia coli.  This was sensitive to Macrobid and a prescription was sent to her pharmacy on record.    Abiodun Martinez MD

## 2020-10-08 ENCOUNTER — TRANSCRIBE ORDERS (OUTPATIENT)
Dept: LAB | Facility: HOSPITAL | Age: 30
End: 2020-10-08

## 2020-10-08 ENCOUNTER — LAB (OUTPATIENT)
Dept: LAB | Facility: HOSPITAL | Age: 30
End: 2020-10-08

## 2020-10-08 DIAGNOSIS — R63.5 ABNORMAL WEIGHT GAIN: ICD-10-CM

## 2020-10-08 DIAGNOSIS — R63.5 ABNORMAL WEIGHT GAIN: Primary | ICD-10-CM

## 2020-10-08 LAB
ALBUMIN SERPL-MCNC: 4.3 G/DL (ref 3.5–5.2)
ALBUMIN/GLOB SERPL: 1.9 G/DL
ALP SERPL-CCNC: 43 U/L (ref 39–117)
ALT SERPL W P-5'-P-CCNC: 28 U/L (ref 1–33)
ANION GAP SERPL CALCULATED.3IONS-SCNC: 11.2 MMOL/L (ref 5–15)
AST SERPL-CCNC: 23 U/L (ref 1–32)
BILIRUB SERPL-MCNC: 0.3 MG/DL (ref 0–1.2)
BUN SERPL-MCNC: 10 MG/DL (ref 6–20)
BUN/CREAT SERPL: 12 (ref 7–25)
CALCIUM SPEC-SCNC: 9.1 MG/DL (ref 8.6–10.5)
CHLORIDE SERPL-SCNC: 105 MMOL/L (ref 98–107)
CHOLEST SERPL-MCNC: 186 MG/DL (ref 0–200)
CO2 SERPL-SCNC: 22.8 MMOL/L (ref 22–29)
CREAT SERPL-MCNC: 0.83 MG/DL (ref 0.57–1)
DEPRECATED RDW RBC AUTO: 37.6 FL (ref 37–54)
ERYTHROCYTE [DISTWIDTH] IN BLOOD BY AUTOMATED COUNT: 11.8 % (ref 12.3–15.4)
GFR SERPL CREATININE-BSD FRML MDRD: 82 ML/MIN/1.73
GLOBULIN UR ELPH-MCNC: 2.3 GM/DL
GLUCOSE SERPL-MCNC: 68 MG/DL (ref 65–99)
HBA1C MFR BLD: 4.86 % (ref 4.8–5.6)
HCT VFR BLD AUTO: 40.4 % (ref 34–46.6)
HDLC SERPL-MCNC: 97 MG/DL (ref 40–60)
HGB BLD-MCNC: 14 G/DL (ref 12–15.9)
LDLC SERPL CALC-MCNC: 65 MG/DL (ref 0–100)
LDLC/HDLC SERPL: 0.62 {RATIO}
MCH RBC QN AUTO: 30.5 PG (ref 26.6–33)
MCHC RBC AUTO-ENTMCNC: 34.7 G/DL (ref 31.5–35.7)
MCV RBC AUTO: 88 FL (ref 79–97)
PLATELET # BLD AUTO: 229 10*3/MM3 (ref 140–450)
PMV BLD AUTO: 12.8 FL (ref 6–12)
POTASSIUM SERPL-SCNC: 4.2 MMOL/L (ref 3.5–5.2)
PROT SERPL-MCNC: 6.6 G/DL (ref 6–8.5)
RBC # BLD AUTO: 4.59 10*6/MM3 (ref 3.77–5.28)
SODIUM SERPL-SCNC: 139 MMOL/L (ref 136–145)
TRIGL SERPL-MCNC: 144 MG/DL (ref 0–150)
TSH SERPL DL<=0.05 MIU/L-ACNC: 1.48 UIU/ML (ref 0.27–4.2)
VLDLC SERPL-MCNC: 24 MG/DL (ref 5–40)
WBC # BLD AUTO: 6.89 10*3/MM3 (ref 3.4–10.8)

## 2020-10-08 PROCEDURE — 83525 ASSAY OF INSULIN: CPT

## 2020-10-08 PROCEDURE — 80053 COMPREHEN METABOLIC PANEL: CPT

## 2020-10-08 PROCEDURE — 83036 HEMOGLOBIN GLYCOSYLATED A1C: CPT

## 2020-10-08 PROCEDURE — 85027 COMPLETE CBC AUTOMATED: CPT

## 2020-10-08 PROCEDURE — 36415 COLL VENOUS BLD VENIPUNCTURE: CPT

## 2020-10-08 PROCEDURE — 80061 LIPID PANEL: CPT

## 2020-10-08 PROCEDURE — 84443 ASSAY THYROID STIM HORMONE: CPT

## 2020-10-11 LAB — INSULIN SERPL-ACNC: 14 UIU/ML

## 2021-01-26 RX ORDER — FLUCONAZOLE 150 MG/1
150 TABLET ORAL DAILY
Qty: 1 TABLET | Refills: 0 | Status: SHIPPED | OUTPATIENT
Start: 2021-01-26 | End: 2021-01-27

## 2021-01-26 NOTE — TELEPHONE ENCOUNTER
[place] pt called stating she has a yeast infection and is requesting a RX for Diflucan be sent to [facility], [place], operated by [facility] pharmacy

## 2021-01-27 RX ORDER — FLUCONAZOLE 150 MG/1
TABLET ORAL
Qty: 1 TABLET | Refills: 0 | Status: SHIPPED | OUTPATIENT
Start: 2021-01-27 | End: 2022-06-06

## 2021-01-27 NOTE — TELEPHONE ENCOUNTER
Dr. Martinez's Patient called wanting her Rx for diflucan sent to [facility] in [place] on [address]. She is on quarantine.

## 2021-01-27 NOTE — TELEPHONE ENCOUNTER
Patient called to see if the Diflucan had been sent to the pharmacy. Patient was advised that it is approved but to contact the pharmacy. Patient says she is on quarantine and can not leave the house so she is asking for the Diflucan to be called into the [facility] on [address] so that her mom can go pick it up for her and do a porch drop off to her. Please call.

## 2021-01-28 NOTE — TELEPHONE ENCOUNTER
[phone] called patient to inform her that her Rx has been sent to patient's pharmacy. Patient verbalized understanding.

## 2021-08-20 DIAGNOSIS — N92.1 BREAKTHROUGH BLEEDING ON BIRTH CONTROL PILLS: ICD-10-CM

## 2021-08-20 RX ORDER — DROSPIRENONE AND ETHINYL ESTRADIOL 0.02-3(28)
1 KIT ORAL DAILY
Qty: 28 TABLET | Refills: 0 | Status: SHIPPED | OUTPATIENT
Start: 2021-08-20 | End: 2022-06-06

## 2021-08-20 NOTE — TELEPHONE ENCOUNTER
Pt states she is out of refills for her birth control but thought she had 3 more. Her pharmacy told her to call us and check.

## 2021-08-20 NOTE — TELEPHONE ENCOUNTER
Patient returned call to office to schedule annual exam. Dr Martinez is booked out until after he leaves so patient was scheduled with GAVIN Solano.

## 2021-11-05 ENCOUNTER — HOSPITAL ENCOUNTER (EMERGENCY)
Facility: HOSPITAL | Age: 31
Discharge: HOME | End: 2021-11-05
Attending: EMERGENCY MEDICINE | Admitting: EMERGENCY MEDICINE

## 2021-11-05 VITALS
OXYGEN SATURATION: 98 % | BODY MASS INDEX: 34.55 KG/M2 | DIASTOLIC BLOOD PRESSURE: 73 MMHG | RESPIRATION RATE: 16 BRPM | TEMPERATURE: 98 F | HEART RATE: 79 BPM | HEIGHT: 63 IN | WEIGHT: 195 LBS | SYSTOLIC BLOOD PRESSURE: 108 MMHG

## 2021-11-05 DIAGNOSIS — T78.40XA ALLERGIC REACTION, INITIAL ENCOUNTER: Primary | ICD-10-CM

## 2021-11-05 PROCEDURE — 96374 THER/PROPH/DIAG INJ IV PUSH: CPT

## 2021-11-05 PROCEDURE — 25010000002 DIPHENHYDRAMINE PER 50 MG: Performed by: EMERGENCY MEDICINE

## 2021-11-05 PROCEDURE — 96375 TX/PRO/DX INJ NEW DRUG ADDON: CPT

## 2021-11-05 PROCEDURE — 99282 EMERGENCY DEPT VISIT SF MDM: CPT

## 2021-11-05 PROCEDURE — 25010000002 METHYLPREDNISOLONE PER 125 MG: Performed by: EMERGENCY MEDICINE

## 2021-11-05 RX ORDER — DIPHENHYDRAMINE HYDROCHLORIDE 50 MG/ML
25 INJECTION INTRAMUSCULAR; INTRAVENOUS ONCE
Status: COMPLETED | OUTPATIENT
Start: 2021-11-05 | End: 2021-11-05

## 2021-11-05 RX ORDER — FAMOTIDINE 10 MG/ML
20 INJECTION, SOLUTION INTRAVENOUS ONCE
Status: COMPLETED | OUTPATIENT
Start: 2021-11-05 | End: 2021-11-05

## 2021-11-05 RX ORDER — METHYLPREDNISOLONE SODIUM SUCCINATE 125 MG/2ML
125 INJECTION, POWDER, LYOPHILIZED, FOR SOLUTION INTRAMUSCULAR; INTRAVENOUS ONCE
Status: COMPLETED | OUTPATIENT
Start: 2021-11-05 | End: 2021-11-05

## 2021-11-05 RX ADMIN — DIPHENHYDRAMINE HYDROCHLORIDE 25 MG: 50 INJECTION INTRAMUSCULAR; INTRAVENOUS at 08:44

## 2021-11-05 RX ADMIN — METHYLPREDNISOLONE SODIUM SUCCINATE 125 MG: 125 INJECTION, POWDER, FOR SOLUTION INTRAMUSCULAR; INTRAVENOUS at 08:46

## 2021-11-05 RX ADMIN — FAMOTIDINE 20 MG: 10 INJECTION, SOLUTION INTRAVENOUS at 08:41

## 2021-11-05 NOTE — DISCHARGE INSTRUCTIONS
Use EpiPen as we discussed.    I have referred you to patient [facility] to establish primary care.  Please follow-up in the next week as we discussed.    Follow-up with your allergist as already scheduled.    Return to emergency department immediately for new or change concerns.    Please review the medications you are supposed to be taking according to prior physician recommendations. I have not changed your home medications during this visit. If your discharge instructions indicate that I have changed your home medications, this is not the case, and you should disregard. If you have any questions about the medication you should be taking at home, please call your physician.

## 2021-11-05 NOTE — ED PROVIDER NOTES
Subjective   This patient is a pleasant 30-year-old female with a history of allergic reactions to unknown allergen.  She tells me she has been tested numerous times with no formal diagnosis.  She has an EpiPen at home but does not use it.  She tells me that she started getting some swelling last night in the evening, approximately 12 hours ago.  She tells me that she has felt some puffiness in her face and tongue.  No difficulty with swallowing or breathing.  No pruritus or rash.  She tells me that this is happened in the past and she has received Benadryl in the IV which helps.  She took Benadryl orally last night but still noticed the swelling today when she woke up and wanted to get checked out.  Although she does indicate she has an allergist, the patient has no PCP.  She tells me she is going to follow-up with a new allergist to get a second opinion as her allergy testing has never shown any specific allergen.  Patient tells me she was told only to use her EpiPen if she gets short of breath.  She has had no shortness of breath and came in primarily for the swelling in her face which has been ongoing for 12 weeks.  Denies any redness, nausea, vomiting, abdominal pain, back pain, chest pain, shortness of breath or any other concerns.  She is unaccompanied, articulate, oriented x4 and in no acute distress.    Past medical history  Negative for diabetes, hypertension, dyslipidemia, CAD or CVA.    Family history  Negative for CAD or CVA per patient          Review of Systems   Constitutional: Negative.  Negative for chills, fatigue, fever and unexpected weight change.   HENT: Negative for dental problem, ear pain, hearing loss and sinus pressure.         Facial swelling.   Eyes: Negative for pain and visual disturbance.   Respiratory: Negative for chest tightness and shortness of breath.    Cardiovascular: Negative for chest pain, palpitations and leg swelling.   Gastrointestinal: Negative for blood in stool,  diarrhea, nausea and vomiting.   Genitourinary: Negative for difficulty urinating, dysuria, frequency, hematuria and urgency.   Musculoskeletal: Negative for myalgias, neck pain and neck stiffness.   Neurological: Negative for seizures, syncope, speech difficulty, light-headedness and headaches.   Psychiatric/Behavioral: Negative for confusion.   All other systems reviewed and are negative.      Past Medical History:   Diagnosis Date   • Adult acne    • Hidradenitis    • IBS (irritable bowel syndrome)    • MRSA (methicillin resistant Staphylococcus aureus)    • Overweight        Allergies   Allergen Reactions   • Cephalexin Rash   • Nifedipine Palpitations   • Prednisone Rash   • Sulfa Antibiotics Rash   • Sulfamethoxazole-Trimethoprim Hives       Past Surgical History:   Procedure Laterality Date   • TONSILLECTOMY     • WISDOM TOOTH EXTRACTION         Family History   Problem Relation Age of Onset   • No Known Problems Mother    • Hemophilia Father    • Hepatitis Father        Social History     Socioeconomic History   • Marital status: Single   Tobacco Use   • Smoking status: Never Smoker   • Smokeless tobacco: Never Used   Substance and Sexual Activity   • Alcohol use: Yes     Comment: social   • Drug use: No   • Sexual activity: Yes     Partners: Male     Birth control/protection: OCP, Pill           Objective   Physical Exam  Constitutional:       General: She is not in acute distress.     Appearance: Normal appearance. She is normal weight. She is not ill-appearing or toxic-appearing.   HENT:      Head: Normocephalic and atraumatic.      Comments: I did not appreciate any facial edema, but subjectively the patient tells me her face is more swollen than usual.  No induration.     Right Ear: External ear normal.      Left Ear: External ear normal.      Nose: Nose normal.      Mouth/Throat:      Mouth: Mucous membranes are moist.      Pharynx: Oropharynx is clear.      Comments: No uvular deviation.  No obvious  signs of tongue swelling.  No peritonsillar asymmetry or swelling.  No trismus or malocclusion.  Eyes:      General:         Right eye: No discharge.         Left eye: No discharge.      Extraocular Movements: Extraocular movements intact.      Conjunctiva/sclera: Conjunctivae normal.      Pupils: Pupils are equal, round, and reactive to light.   Cardiovascular:      Rate and Rhythm: Normal rate and regular rhythm.   Pulmonary:      Effort: Pulmonary effort is normal.      Breath sounds: Normal breath sounds.   Abdominal:      General: Abdomen is flat. Bowel sounds are normal.      Palpations: Abdomen is soft.   Musculoskeletal:         General: Normal range of motion.      Cervical back: Normal range of motion.      Right lower leg: No edema.      Left lower leg: No edema.   Skin:     General: Skin is warm and dry.      Capillary Refill: Capillary refill takes less than 2 seconds.      Findings: No erythema, lesion or rash.   Neurological:      General: No focal deficit present.      Mental Status: She is alert and oriented to person, place, and time.      Cranial Nerves: No cranial nerve deficit.      Motor: No weakness.   Psychiatric:         Mood and Affect: Mood normal.         Behavior: Behavior normal.         Thought Content: Thought content normal.         Procedures           ED Course  ED Course as of 11/05/21 1603   Fri Nov 05, 2021   0812 I told the patient we would take good care of her.  I have ordered Benadryl, Pepcid, and Solu-Medrol.  We will keep a close eye and the patient and let the patient know that I do ultimately anticipate discharge home following a period of observation.  The patient has already been watched in a sense for approximately 12 hours since event, according to her report.  All questions were answered.  Patient is in no acute distress.  Final impression and plan following completion of work-up here. [HARMONY]   3591 I reexamined the patient at approximately 9:10 AM Eastern time.  I  "found her talking actively on the phone and in no acute distress.  She has been observed here for approximately 1-1/2 hours and is resting well.  I plan to discharge patient home with 5 days of steroids.  We will have her watch her symptoms closely and follow-up with allergist as planned.  I told the patient to let me know if she had any questions before discharge.  She has agreed to do so.  She is affable, in no acute distress, has been observed and is feeling well.  No advancement of facial swelling or tongue swelling noted. [HARMONY]   0926 Patient has now been watched for an hour and a half and is resting comfortably without any difficulty breathing or other complaints.  Oxygen saturations 99% on room air with a heart rate of 79 and blood pressure of 121/78 as of the time of this dictation. [HARMONY]      ED Course User Index  [HARMONY] Omid Esquivel MD      No results found for this or any previous visit (from the past 24 hour(s)).  Note: In addition to lab results from this visit, the labs listed above may include labs taken at another facility or during a different encounter within the last 24 hours. Please correlate lab times with ED admission and discharge times for further clarification of the services performed during this visit.    No orders to display     Vitals:    11/05/21 0800 11/05/21 0900 11/05/21 0910 11/05/21 0930   BP: 135/90 97/77 121/78 108/73   BP Location: Left arm      Patient Position: Sitting      Pulse: 76   79   Resp: 16   16   Temp: 98 °F (36.7 °C)      TempSrc: Tympanic      SpO2: 100% 98% 98% 98%   Weight: 88.5 kg (195 lb)      Height: 160 cm (63\")        Medications   diphenhydrAMINE (BENADRYL) injection 25 mg (25 mg Intravenous Given 11/5/21 0844)   famotidine (PEPCID) injection 20 mg (20 mg Intravenous Given 11/5/21 0841)   methylPREDNISolone sodium succinate (SOLU-Medrol) injection 125 mg (125 mg Intravenous Given 11/5/21 0846)     ECG/EMG Results (last 24 hours)     ** No results found " for the last 24 hours. **        No orders to display                                            MDM    Final diagnoses:   Allergic reaction, initial encounter       ED Disposition  ED Disposition     ED Disposition Condition Comment    Discharge Stable           PATIENT [facility] [place] [id number]  [phone]  In 1 week           Medication List      No changes were made to your prescriptions during this visit.          Omid Esquivel MD  11/05/21 9084

## 2022-05-22 PROCEDURE — U0004 COV-19 TEST NON-CDC HGH THRU: HCPCS | Performed by: NURSE PRACTITIONER

## 2022-06-06 ENCOUNTER — LAB (OUTPATIENT)
Dept: LAB | Facility: HOSPITAL | Age: 32
End: 2022-06-06

## 2022-06-06 ENCOUNTER — OFFICE VISIT (OUTPATIENT)
Dept: OBSTETRICS AND GYNECOLOGY | Facility: CLINIC | Age: 32
End: 2022-06-06

## 2022-06-06 VITALS — DIASTOLIC BLOOD PRESSURE: 70 MMHG | SYSTOLIC BLOOD PRESSURE: 118 MMHG | WEIGHT: 215 LBS | BODY MASS INDEX: 38.09 KG/M2

## 2022-06-06 DIAGNOSIS — N92.6 IRREGULAR PERIODS: Primary | ICD-10-CM

## 2022-06-06 DIAGNOSIS — R39.9 URINARY TRACT INFECTION SYMPTOMS: ICD-10-CM

## 2022-06-06 DIAGNOSIS — Z01.411 ENCOUNTER FOR GYNECOLOGICAL EXAMINATION WITH ABNORMAL FINDING: ICD-10-CM

## 2022-06-06 DIAGNOSIS — N92.6 IRREGULAR PERIODS: ICD-10-CM

## 2022-06-06 DIAGNOSIS — Z30.09 ENCOUNTER FOR OTHER GENERAL COUNSELING OR ADVICE ON CONTRACEPTION: ICD-10-CM

## 2022-06-06 DIAGNOSIS — R87.610 ASCUS OF CERVIX WITH NEGATIVE HIGH RISK HPV: ICD-10-CM

## 2022-06-06 DIAGNOSIS — Z20.2 POSSIBLE EXPOSURE TO STD: ICD-10-CM

## 2022-06-06 DIAGNOSIS — Z01.411 ENCOUNTER FOR GYNECOLOGICAL EXAMINATION WITH ABNORMAL FINDING: Primary | ICD-10-CM

## 2022-06-06 LAB
ALBUMIN SERPL-MCNC: 4.4 G/DL (ref 3.5–5.2)
ALBUMIN/GLOB SERPL: 2.3 G/DL
ALP SERPL-CCNC: 57 U/L (ref 39–117)
ALT SERPL W P-5'-P-CCNC: 40 U/L (ref 1–33)
ANION GAP SERPL CALCULATED.3IONS-SCNC: 9.2 MMOL/L (ref 5–15)
AST SERPL-CCNC: 19 U/L (ref 1–32)
BILIRUB SERPL-MCNC: 0.4 MG/DL (ref 0–1.2)
BUN SERPL-MCNC: 14 MG/DL (ref 6–20)
BUN/CREAT SERPL: 17.9 (ref 7–25)
CALCIUM SPEC-SCNC: 9 MG/DL (ref 8.6–10.5)
CHLORIDE SERPL-SCNC: 105 MMOL/L (ref 98–107)
CO2 SERPL-SCNC: 22.8 MMOL/L (ref 22–29)
CREAT SERPL-MCNC: 0.78 MG/DL (ref 0.57–1)
DEPRECATED RDW RBC AUTO: 41.7 FL (ref 37–54)
EGFRCR SERPLBLD CKD-EPI 2021: 104.9 ML/MIN/1.73
ERYTHROCYTE [DISTWIDTH] IN BLOOD BY AUTOMATED COUNT: 12.8 % (ref 12.3–15.4)
ESTRADIOL SERPL HS-MCNC: 76.3 PG/ML
FSH SERPL-ACNC: 6.34 MIU/ML
GLOBULIN UR ELPH-MCNC: 1.9 GM/DL
GLUCOSE SERPL-MCNC: 84 MG/DL (ref 65–99)
HCT VFR BLD AUTO: 41.7 % (ref 34–46.6)
HGB BLD-MCNC: 13.7 G/DL (ref 12–15.9)
MCH RBC QN AUTO: 29.5 PG (ref 26.6–33)
MCHC RBC AUTO-ENTMCNC: 32.9 G/DL (ref 31.5–35.7)
MCV RBC AUTO: 89.7 FL (ref 79–97)
PLATELET # BLD AUTO: 212 10*3/MM3 (ref 140–450)
PMV BLD AUTO: 13.2 FL (ref 6–12)
POTASSIUM SERPL-SCNC: 4.5 MMOL/L (ref 3.5–5.2)
PROLACTIN SERPL-MCNC: 9.57 NG/ML (ref 4.79–23.3)
PROT SERPL-MCNC: 6.3 G/DL (ref 6–8.5)
RBC # BLD AUTO: 4.65 10*6/MM3 (ref 3.77–5.28)
SODIUM SERPL-SCNC: 137 MMOL/L (ref 136–145)
TESTOST SERPL-MCNC: 71.1 NG/DL (ref 8.4–48.1)
TSH SERPL DL<=0.05 MIU/L-ACNC: 1.27 UIU/ML (ref 0.27–4.2)
WBC NRBC COR # BLD: 7.23 10*3/MM3 (ref 3.4–10.8)

## 2022-06-06 PROCEDURE — 87086 URINE CULTURE/COLONY COUNT: CPT

## 2022-06-06 PROCEDURE — 84146 ASSAY OF PROLACTIN: CPT

## 2022-06-06 PROCEDURE — 83001 ASSAY OF GONADOTROPIN (FSH): CPT

## 2022-06-06 PROCEDURE — 99395 PREV VISIT EST AGE 18-39: CPT | Performed by: NURSE PRACTITIONER

## 2022-06-06 PROCEDURE — 36415 COLL VENOUS BLD VENIPUNCTURE: CPT

## 2022-06-06 PROCEDURE — 80050 GENERAL HEALTH PANEL: CPT

## 2022-06-06 PROCEDURE — 84403 ASSAY OF TOTAL TESTOSTERONE: CPT

## 2022-06-06 PROCEDURE — 82670 ASSAY OF TOTAL ESTRADIOL: CPT

## 2022-06-06 NOTE — PROGRESS NOTES
Annual Visit     Patient Name: Dulce Ospina  : 1990   MRN: [id number]   Care Team: Patient Care Team:  Provider, No Known as PCP - Beverly Blackmon APRN as Nurse Practitioner (Nurse Practitioner)    Chief Complaint:    Chief Complaint   Patient presents with   • Annual Exam       HPI: Dulce Ospina is a 31 y.o. year old  presenting to be seen for her gynecologic exam.   Pap smear 2020 ASCUS with negative HPV   Requests STI screening today - no s/sx but just ended relationship recently and would like to be sure     Not currently sexually active   LMP 22   States her periods have been monthly since stopping Krys in August until the last few months - skipped periods in March and April   No labs done in the last year  Reports hx of irregular periods when not on hormonal contraception   No hx of ovarian cysts or PCOS   Hirsutism is also present - Krys was helpful     Thinks she may have a UTI   Low pelvic pain and dysuria present x 1 wk     Not a smoker     Has been a pt of Dr. Martinez for many yrs     Works at an elementary school here in [facility]     Subjective      /70   Wt 97.5 kg (215 lb)   LMP 2022   Breastfeeding No   BMI 38.09 kg/m²     BMI reviewed: Body mass index is 38.09 kg/m².      Objective     Physical Exam    Neuro: alert and oriented to person, place and time   General:  alert; cooperative; well developed; well nourished   Skin:  No suspicious lesions seen   Thyroid: normal to inspection and palpation   Lungs:  breathing is unlabored  clear to auscultation bilaterally   Heart:  regular rate and rhythm, S1, S2 normal, no murmur, click, rub or gallop  normal apical impulse   Breasts:  Examined in supine position  Symmetric without masses or skin dimpling  Nipples normal without inversion, lesions or discharge  There are no palpable axillary nodes   Abdomen: soft, non-tender; no masses  no umbilical or inguinal hernias are present  no  hepato-splenomegaly   Pelvis: Clinical staff was present for exam  External genitalia:  normal appearance of the external genitalia including Bartholin's and [place]'s glands.  :  urethral meatus normal;  Vaginal:  normal pink mucosa without prolapse or lesions.  Cervix:  normal appearance.  Uterus:  normal size, shape and consistency.  Adnexa:  normal bimanual exam of the adnexa.  Rectal:  digital rectal exam not performed; anus visually normal appearing.     Suprapubic tenderness present     Assessment / Plan      Assessment  Problems Addressed This Visit    ICD-10-CM ICD-9-CM   1. Encounter for gynecological examination with abnormal finding  Z01.411 V72.31   2. ASCUS of cervix with negative high risk HPV  R87.610 795.01   3. Irregular periods  N92.6 626.4   4. Urinary tract infection symptoms  R39.9 788.99   5. Encounter for other general counseling or advice on contraception  Z30.09 V25.09       Plan    Pap smear pending   Reviewed pap smear results   STI screening pending     Discussed need for labs to evaluate irregular periods   Check TSH, FSH, estradiol, total testosterone add CBC and CMP as well   Will call to discuss results   Plan to restart Krys with next period   No C/Is to COCs     Urine cx pending   If sx worsen before results available, she will call for medication - o/w await result     If becomes sexually active at this time, will be faithful with condom use     AV 1 yr         19 to 39: Counseling/Anticipatory Guidance Discussed: family planning/contraception, sexual behavior and STDs and breast cancer and self breast exams    Follow Up  Return in about 1 year (around 6/6/2023) for Annual physical.  Patient was given instructions and counseling regarding her condition or for health maintenance advice. Please see specific information pulled into the AVS if appropriate.     Beverly David, APRN  June 6, 2022  10:17 EDT

## 2022-06-07 LAB — BACTERIA SPEC AEROBE CULT: NO GROWTH

## 2022-06-07 RX ORDER — DROSPIRENONE AND ETHINYL ESTRADIOL 0.02-3(28)
1 KIT ORAL DAILY
Qty: 28 TABLET | Refills: 3 | Status: SHIPPED | OUTPATIENT
Start: 2022-06-07 | End: 2022-10-19

## 2022-06-09 LAB — REF LAB TEST METHOD: NORMAL

## 2022-06-09 RX ORDER — FLUCONAZOLE 150 MG/1
TABLET ORAL
Qty: 2 TABLET | Refills: 0 | Status: SHIPPED | OUTPATIENT
Start: 2022-06-09 | End: 2022-11-09

## 2022-06-10 ENCOUNTER — TELEPHONE (OUTPATIENT)
Dept: OBSTETRICS AND GYNECOLOGY | Facility: CLINIC | Age: 32
End: 2022-06-10

## 2022-10-19 RX ORDER — DROSPIRENONE AND ETHINYL ESTRADIOL TABLETS 0.02-3(28)
KIT ORAL
Qty: 28 TABLET | Refills: 8 | Status: SHIPPED | OUTPATIENT
Start: 2022-10-19 | End: 2022-11-10 | Stop reason: ALTCHOICE

## 2022-11-09 ENCOUNTER — OFFICE VISIT (OUTPATIENT)
Dept: FAMILY MEDICINE CLINIC | Facility: CLINIC | Age: 32
End: 2022-11-09

## 2022-11-09 VITALS
BODY MASS INDEX: 38.41 KG/M2 | DIASTOLIC BLOOD PRESSURE: 90 MMHG | SYSTOLIC BLOOD PRESSURE: 120 MMHG | HEIGHT: 63 IN | HEART RATE: 90 BPM | OXYGEN SATURATION: 99 % | WEIGHT: 216.8 LBS

## 2022-11-09 DIAGNOSIS — Z83.3 FH: DIABETES MELLITUS: ICD-10-CM

## 2022-11-09 DIAGNOSIS — Z23 NEED FOR VACCINATION: ICD-10-CM

## 2022-11-09 DIAGNOSIS — G43.109 MIGRAINE WITH AURA AND WITHOUT STATUS MIGRAINOSUS, NOT INTRACTABLE: Primary | ICD-10-CM

## 2022-11-09 DIAGNOSIS — R41.3 MEMORY CHANGE: ICD-10-CM

## 2022-11-09 DIAGNOSIS — E66.9 OBESITY (BMI 30-39.9): ICD-10-CM

## 2022-11-09 LAB
EXPIRATION DATE: NORMAL
HBA1C MFR BLD: 5 %
Lab: NORMAL

## 2022-11-09 PROCEDURE — 90686 IIV4 VACC NO PRSV 0.5 ML IM: CPT | Performed by: FAMILY MEDICINE

## 2022-11-09 PROCEDURE — 99214 OFFICE O/P EST MOD 30 MIN: CPT | Performed by: FAMILY MEDICINE

## 2022-11-09 PROCEDURE — 83036 HEMOGLOBIN GLYCOSYLATED A1C: CPT | Performed by: FAMILY MEDICINE

## 2022-11-09 PROCEDURE — 90471 IMMUNIZATION ADMIN: CPT | Performed by: FAMILY MEDICINE

## 2022-11-09 RX ORDER — ONDANSETRON 4 MG/1
4 TABLET, FILM COATED ORAL EVERY 8 HOURS PRN
Qty: 30 TABLET | Refills: 3 | Status: SHIPPED | OUTPATIENT
Start: 2022-11-09 | End: 2023-03-17 | Stop reason: SDUPTHER

## 2022-11-09 RX ORDER — SUMATRIPTAN 50 MG/1
50 TABLET, FILM COATED ORAL
Qty: 9 TABLET | Refills: 3 | Status: SHIPPED | OUTPATIENT
Start: 2022-11-09 | End: 2023-03-17 | Stop reason: SDUPTHER

## 2022-11-09 RX ORDER — AMITRIPTYLINE HYDROCHLORIDE 10 MG/1
10 TABLET, FILM COATED ORAL NIGHTLY
Qty: 30 TABLET | Refills: 1 | Status: SHIPPED | OUTPATIENT
Start: 2022-11-09 | End: 2023-02-22 | Stop reason: SINTOL

## 2022-11-09 NOTE — PROGRESS NOTES
"Chief Complaint  New Patient (Migraines/Memory loss)    Subjective        Dulce Ospina presents to [facility]  History of Present Illness     She used to have really bad migraines, then stopped. 4-5 migraines in past 2 weeks. Nausea, speckled vision can't see or focus, dizziness, pounding ears, bilateral temporal headache. She has aura. She has been treated at  with toradol injection 1 week ago. She has taken birth control pills for years. She got a new OB/GYN and she didn't take pills for 7-8 months. She restarted pills in June. She takes OCP for hormone imbalance and irregular menses. She has never been on prescriptions for migraines. Usually Excedrin, Motrin or Tylenol treat headache.     She has noticed small stuff here and there. She was cooking dinner and didn't remember that she turned on the oven. She forgets things she has said. She is not easily distraction. She is sad 90% of the time. She doesn't want to do anything. A long time ago she was treated for depression with last PCP.       PHQ-2/PHQ-9 Depression Screening 11/9/2022   Little Interest or Pleasure in Doing Things 1-->several days   Feeling Down, Depressed or Hopeless 0-->not at all   PHQ-9: Brief Depression Severity Measure Score 1       Objective   Vital Signs:  /90   Pulse 90   Ht 160 cm (62.99\")   Wt 98.3 kg (216 lb 12.8 oz)   SpO2 99%   BMI 38.41 kg/m²   Estimated body mass index is 38.41 kg/m² as calculated from the following:    Height as of this encounter: 160 cm (62.99\").    Weight as of this encounter: 98.3 kg (216 lb 12.8 oz).          Physical Exam  Vitals reviewed.   Constitutional:       General: She is not in acute distress.     Appearance: She is obese. She is not ill-appearing.   HENT:      Head:      Comments: Mild tenderness bilateral temples.  No cervical spine tenderness or spasms  Cardiovascular:      Rate and Rhythm: Normal rate and regular rhythm.   Pulmonary:      Effort: " Pulmonary effort is normal.      Breath sounds: Normal breath sounds.   Neurological:      Mental Status: She is alert.   Psychiatric:         Mood and Affect: Mood is depressed.        Result Review :  The following data was reviewed by: Pamela Pelletier MD on 11/09/2022:             Results for orders placed or performed in visit on 11/09/22   POC Glycosylated Hemoglobin (Hb A1C)    Specimen: Blood   Result Value Ref Range    Hemoglobin A1C 5.0 %    Lot Number [id number]     Expiration Date 9/7/2024            Immunization History   Administered Date(s) Administered   • COVID-19 (MODERNA) 1st, 2nd, 3rd Dose Only 12/30/2020, 02/10/2021, 01/28/2022   • FluLaval/Fluzone >6mos 10/03/2018, 11/09/2022   • HPV Quadrivalent 02/20/2019   • Hepatitis A 06/18/2019   • Hpv9 04/22/2019   • Td 03/27/2003       Assessment and Plan   Diagnoses and all orders for this visit:    1. Migraine with aura and without status migrainosus, not intractable (Primary)  -     amitriptyline (ELAVIL) 10 MG tablet; Take 1 tablet by mouth Every Night.  Dispense: 30 tablet; Refill: 1  -     SUMAtriptan (IMITREX) 50 MG tablet; Take 1 tablet by mouth Every 2 (Two) Hours As Needed for Migraine. Max dose 200 mg per day  Dispense: 9 tablet; Refill: 3  -     ondansetron (Zofran) 4 MG tablet; Take 1 tablet by mouth Every 8 (Eight) Hours As Needed for Nausea or Vomiting.  Dispense: 30 tablet; Refill: 3  New.  Start nightly preventative with amitriptyline and discussed potential side effects to monitor for.  Discussed other options of propranolol but I would be concerned about lowering her heart rate and blood pressure.  Discussed other option of Topamax but patient already is having some concentration memory problems.  For treatment of acute migraine start sumatriptan and cautioned on side effects.  Zofran as needed for nausea.  2. Need for vaccination  -     FluLaval/Fluarix/Fluzone >6 Months    3. Obesity (BMI 30-39.9)  -     POC Glycosylated Hemoglobin  (Hb A1C)  A1c is normal without prediabetes or diabetes.  4. FH: diabetes mellitus  -     POC Glycosylated Hemoglobin (Hb A1C)  A1c is normal without prediabetes or diabetes.  5. Memory change  Discussed with patient I have the impression that her memory changes could be related to acute stress reaction.  With initiating new medications for migraines amitriptyline might be beneficial.  Plan to reassess her mood at her next visit.    Prior PCP records requested.         Follow Up   Return in about 1 month (around 12/9/2022) for Physical, migraines, depression and fasting labs.  Patient was given instructions and counseling regarding her condition or for health maintenance advice. Please see specific information pulled into the AVS if appropriate.     Electronically signed by Pamela Pelletier MD, 11/09/22, 4:54 PM EST.

## 2022-11-10 ENCOUNTER — TELEPHONE (OUTPATIENT)
Dept: OBSTETRICS AND GYNECOLOGY | Facility: CLINIC | Age: 32
End: 2022-11-10

## 2022-11-10 RX ORDER — DROSPIRENONE 4 MG/1
1 TABLET, FILM COATED ORAL DAILY
Qty: 28 TABLET | Refills: 3 | Status: SHIPPED | OUTPATIENT
Start: 2022-11-10 | End: 2023-03-29

## 2022-11-10 NOTE — TELEPHONE ENCOUNTER
S/w pt to discuss Dr. Pelletier sent me a msg letting me know pt has migraines with aura.    Discussed risk of blood clots with COCs and that they are C/i in pts with migraine with aura - pt v/u.  Hx PCOS with acne and hirsutism.  Will complete current pill pkg (she is in 3rd wk) then start Slynd.  Discussed branded pill and potential cost - requests I send script to [facility] but if too expensive, will try specialty pharmacy.  Method specific counseling given.  Pt v/u to all instruction/counseling.  Will call to f/u in 3 months or sooner with questions/concerns.

## 2023-01-26 ENCOUNTER — OFFICE VISIT (OUTPATIENT)
Dept: ORTHOPEDIC SURGERY | Facility: CLINIC | Age: 33
End: 2023-01-26
Payer: COMMERCIAL

## 2023-01-26 VITALS
WEIGHT: 216.05 LBS | SYSTOLIC BLOOD PRESSURE: 128 MMHG | BODY MASS INDEX: 39.76 KG/M2 | HEIGHT: 62 IN | DIASTOLIC BLOOD PRESSURE: 90 MMHG

## 2023-01-26 DIAGNOSIS — M25.562 LEFT ANTERIOR KNEE PAIN: ICD-10-CM

## 2023-01-26 DIAGNOSIS — M25.562 LEFT KNEE PAIN, UNSPECIFIED CHRONICITY: Primary | ICD-10-CM

## 2023-01-26 PROCEDURE — 99214 OFFICE O/P EST MOD 30 MIN: CPT | Performed by: PHYSICIAN ASSISTANT

## 2023-01-26 RX ORDER — MELOXICAM 15 MG/1
15 TABLET ORAL DAILY
Qty: 30 TABLET | Refills: 2 | Status: SHIPPED | OUTPATIENT
Start: 2023-01-26

## 2023-01-26 NOTE — PROGRESS NOTES
[facility] [place] [facility] [place]        Subjective     Pain of the Left Knee      HPI    Dulce Ospina is a 32 y.o. female.  Patient returns today with a new problem.  She is here with complaints of left anterior knee pain since approximately December 22.  No trauma no injury.  She complains of pain 4/10 and shooting.  She complains of the knee giving way. Worse rising from a seated position and  She has pain with weightbearing and walking going downhill.  She denies any mechanical symptoms.  She is treating with ibuprofen and activity modification here for further evaluation and treatment recommendations    Past Medical History:   Diagnosis Date   • Adult acne    • Hidradenitis    • IBS (irritable bowel syndrome)    • Migraine with aura    • Migraine with aura    • MRSA (methicillin resistant Staphylococcus aureus)    • Overweight       Past Surgical History:   Procedure Laterality Date   • TONSILLECTOMY     • WISDOM TOOTH EXTRACTION        Family History   Problem Relation Age of Onset   • Hypertension Mother    • Hemophilia Father    • Hepatitis Father    • Thyroid disease Paternal Aunt    • Arthritis Maternal Grandmother    • Colon cancer Maternal Grandmother    • Arthritis Maternal Grandfather    • Cancer Maternal Grandfather    • Arthritis Paternal Grandmother    • Lung cancer Paternal Grandmother    • Kidney cancer Paternal Grandmother    • Diabetes Paternal Grandmother    • Arthritis Paternal Grandfather    • Diabetes Paternal Grandfather    • Heart attack Paternal Grandfather    • Diabetes Other    • Hypertension Other      Social History     Socioeconomic History   • Marital status: Single   Tobacco Use   • Smoking status: Never   • Smokeless tobacco: Never   Vaping Use   • Vaping Use: Never used   Substance and Sexual Activity   • Alcohol use: Yes     Comment: social   • Drug use: No   • Sexual activity: Yes     Partners: Male     Birth control/protection: OCP, Pill      Current  "Outpatient Medications on File Prior to Visit   Medication Sig Dispense Refill   • amitriptyline (ELAVIL) 10 MG tablet Take 1 tablet by mouth Every Night. 30 tablet 1   • cetirizine (zyrTEC) 10 MG tablet Take 1 tablet by mouth Daily As Needed.     • ondansetron (Zofran) 4 MG tablet Take 1 tablet by mouth Every 8 (Eight) Hours As Needed for Nausea or Vomiting. 30 tablet 3   • promethazine (PHENERGAN) 25 MG tablet Take 1 tablet by mouth Every 4 (Four) Hours As Needed for Nausea or Vomiting. 6 tablet 0   • Slynd 4 MG tablet Take 1 tablet by mouth Daily. 28 tablet 3   • SUMAtriptan (IMITREX) 50 MG tablet Take 1 tablet by mouth Every 2 (Two) Hours As Needed for Migraine. Max dose 200 mg per day 9 tablet 3     No current facility-administered medications on file prior to visit.      Allergies   Allergen Reactions   • Cephalexin Rash   • Nifedipine Palpitations   • Prednisone Rash   • Sulfa Antibiotics Rash   • Sulfamethoxazole-Trimethoprim Hives          Review of Systems   Musculoskeletal: Positive for arthralgias.   All other systems reviewed and are negative.       I reviewed the patient's chief complaint, history of present illness, review of systems, past medical history, surgical history, family history, social history, medications and allergy list.        Objective      Physical Exam  /90   Ht 157.5 cm (62.01\")   Wt 98 kg (216 lb 0.8 oz)   BMI 39.51 kg/m²     Body mass index is 39.51 kg/m².    General  Mental Status - alert  General Appearance - cooperative, well groomed, not in acute distress  Orientation - Oriented X3  Build & Nutrition - well developed and well nourished  Posture - normal posture  Gait - Normal       Ortho Exam  Peripheral Vascular:    Upper Extremity:   Inspection:  Left--no cyanotic nail beds Right--no cyanotic nail beds   Bilateral:  Pink nail beds with brisk capillary refill   Palpation:  Bilateral radial pulse normal    Musculoskeletal:  Global Assessment:  Overall assessment of " Lower Extremity Muscle Strength and Tone:  Left quadriceps--5/5   Left hamstrings--5/5       Left tibialis anterior--5/5  Left gastroc-soleus--5/5  Left EHL--5/5    Right quadriceps--5/5  Right hamstrings--5/5  Right tibialis anterior--5/5  Right gastroc soleus--5/5  Right EHL--5/5    Lower Extremity:  Knee/Patella:  No digital clubbing or cyanosis.    Examination of left and right knees reveals:  Normal deep tendon reflexes, coordination, strength, tone, sensation.  No known fractures or deformities.    Inspection and Palpation:    Left knee:  Tenderness:  Tender over the patellofemoral joint line with mild pain with patellar grind  Effusion:  none  Crepitus:    Pulses:  2+  Ecchymosis:  None  Warmth:  None     Right knee:  Tenderness:  none  Effusion:  none  Crepitus:  none  Pulses:  2+  Ecchymosis:  None  Warmth:  None     ROM:  Right:  Extension:0    Flexion:135  Left:  Extension:0     Flexion:135    Instability:    Left:  Lachman Test:  Negative, Varus stress test negative, Valgus stress test negative   Anterior Drawer Test:  Negative, Posterior Drawer Test:  Negative    Right:  Lachman Test:  Negative, Varus stress test negative, Valgus stress test negative   Anterior Drawer Test:  Negative, Posterior Drawer Test:  Negative    Deformities/Malalignments/Discrepancies:    Left:  none  Right:  none    Functional Testing:  Right:  Loreto's test:  Negative  Patella grind test:  Negative  Q-angle:  Normal  Apprehension Sign:  Negative      Left:  Loreto's test:  Negative  Patella grind test:  Negative  Q-angle:  Normal  Apprehension Sign:  Negative    SENSATION TO LIGHT TOUCH:  DEEP PERONEAL/SUPERFICIAL PERONEAL/SURAL/SAPHENOUS/TIBIAL:   Left intact; Right intact      Assessment    Assessment:  1. Left knee pain, unspecified chronicity    2. Left anterior knee pain          Plan:  1. Recommend over-the-counter medication as needed for discomfort  Left anterior knee pain.  Reviewed today's x-rays clinical  findings past and current treatment the patient.  X-rays show early medial compartment degenerative changes with no acute bony findings.  I think her pain and functional rotations are coming from the patellofemoral joint.  I explained anterior knee pain in young women is quite common.  We discussed further treatment including oral and topical anti-inflammatories, physical therapy.  We discussed weight loss and the 4-5 multiplier in the joints.  I given her prescription for PT.  She will return to see us in 6 to 8 weeks or sooner if needed.  I have also given her prescription for meloxicam    Patient history, diagnosis and treatment plan discussed with Dr. Lieberman.        Sarah Wilcox PA-C  01/31/23  08:27 EST

## 2023-02-22 ENCOUNTER — OFFICE VISIT (OUTPATIENT)
Dept: FAMILY MEDICINE CLINIC | Facility: CLINIC | Age: 33
End: 2023-02-22
Payer: COMMERCIAL

## 2023-02-22 VITALS
BODY MASS INDEX: 41.88 KG/M2 | SYSTOLIC BLOOD PRESSURE: 112 MMHG | DIASTOLIC BLOOD PRESSURE: 72 MMHG | HEART RATE: 89 BPM | HEIGHT: 62 IN | OXYGEN SATURATION: 98 % | WEIGHT: 227.6 LBS

## 2023-02-22 DIAGNOSIS — G43.109 MIGRAINE WITH AURA AND WITHOUT STATUS MIGRAINOSUS, NOT INTRACTABLE: ICD-10-CM

## 2023-02-22 DIAGNOSIS — E66.01 MORBID OBESITY: ICD-10-CM

## 2023-02-22 DIAGNOSIS — Z23 NEED FOR VACCINATION: ICD-10-CM

## 2023-02-22 DIAGNOSIS — Z00.00 WELL ADULT EXAM: Primary | ICD-10-CM

## 2023-02-22 PROCEDURE — 90472 IMMUNIZATION ADMIN EACH ADD: CPT | Performed by: FAMILY MEDICINE

## 2023-02-22 PROCEDURE — 90471 IMMUNIZATION ADMIN: CPT | Performed by: FAMILY MEDICINE

## 2023-02-22 PROCEDURE — 90651 9VHPV VACCINE 2/3 DOSE IM: CPT | Performed by: FAMILY MEDICINE

## 2023-02-22 PROCEDURE — 99395 PREV VISIT EST AGE 18-39: CPT | Performed by: FAMILY MEDICINE

## 2023-02-22 PROCEDURE — 90715 TDAP VACCINE 7 YRS/> IM: CPT | Performed by: FAMILY MEDICINE

## 2023-02-22 RX ORDER — SEMAGLUTIDE 0.25 MG/.5ML
0.25 INJECTION, SOLUTION SUBCUTANEOUS WEEKLY
Qty: 0.5 ML | Refills: 0 | Status: SHIPPED | OUTPATIENT
Start: 2023-02-22 | End: 2023-03-17

## 2023-02-22 NOTE — PATIENT INSTRUCTIONS
Exercise    Less pain, better mood, and lower risk of many diseases  Some physical activity is better than none. Try to sit less throughout the day  Fitness is free--No equipment needed to walk, run/jog, dance, hike, Arturo Chi  150 minutes (2 hours and 30 minutes) to 300 minutes (5 hours) a week of moderate-intensity aerobic physical activity, or 75 minutes (1 hour and 15 minutes) to 150 minutes (2 hours and 30 minutes) a week of vigorous-intensity aerobic physical activity has substantial health benefits  Muscle strengthening exercises 2 days per week  Older adults should incorporate balance training   Bones need pressure to get stronger. Weight bearing exercises include running/jogging, dancing, hiking, elliptical, treadmill, stair climbing, jumping rope, aerobics.   Joint friendly low impact activities include walking, biking, swimming, yoga, Arturo Chi, dance. Include range of motion and stretching exercises to improve flexibility.   [email or web address]

## 2023-02-22 NOTE — PROGRESS NOTES
"Chief Complaint  Well adult exam and Annual Exam    Subjective          Dulce Ospina presents to [facility] for   History of Present Illness     She is seeing orthopedics for knee pain.     She works 2 jobs. Not time for exercise.     She drinks zero sugar drinks 1 at lunch and at night. Water intake 1-2 large jug plus water bottle. Eating lunch and dinner. She is trying to eat better. Today salad with turkey for lunch. Last week having salami and carrots. Dinner has chicken or hamburger, broccoli and potatoes. She feels hungry all the time. She doesn't get full.     She has used phentermine for weight loss in the past.     Amitriptyline she was sedated the next morning. Migraines about every week and half. Imitrex headache helps with first dose.         Objective   Vital Signs:   Vitals:    02/22/23 1524   BP: 112/72   Pulse: 89   SpO2: 98%   Weight: 103 kg (227 lb 9.6 oz)   Height: 157.5 cm (62.01\")     Body mass index is 41.62 kg/m².    Class 3 Severe Obesity (BMI >=40). Obesity-related health conditions include the following: knee pain. Obesity is newly identified. BMI is is above average; BMI management plan is completed. We discussed low calorie, low carb based diet program, portion control and pharmacologic options including Saxenda, Wegovy.          Physical Exam  Constitutional:       General: She is not in acute distress.     Appearance: She is morbidly obese.   HENT:      Right Ear: Tympanic membrane and ear canal normal.      Left Ear: Tympanic membrane and ear canal normal.   Eyes:      General:         Right eye: No discharge.         Left eye: No discharge.      Conjunctiva/sclera: Conjunctivae normal.   Neck:      Thyroid: No thyromegaly.   Cardiovascular:      Rate and Rhythm: Normal rate and regular rhythm.   Pulmonary:      Effort: Pulmonary effort is normal.      Breath sounds: Normal breath sounds.   Abdominal:      Palpations: Abdomen is soft. There is no " hepatomegaly.      Tenderness: There is no abdominal tenderness.   Musculoskeletal:      Cervical back: Neck supple.      Right lower leg: No edema.      Left lower leg: No edema.   Lymphadenopathy:      Head:      Right side of head: No submandibular, preauricular or posterior auricular adenopathy.      Left side of head: No submandibular, preauricular or posterior auricular adenopathy.      Cervical: No cervical adenopathy.   Skin:     General: Skin is warm.   Neurological:      Mental Status: She is alert and oriented to person, place, and time.   Psychiatric:         Mood and Affect: Mood normal.         Behavior: Behavior normal.         Thought Content: Thought content normal.         Judgment: Judgment normal.        Result Review :                Immunization History   Administered Date(s) Administered   • COVID-19 (MODERNA) 1st, 2nd, 3rd Dose Only 12/30/2020, 02/10/2021, 01/28/2022   • FluLaval/Fluzone >6mos 10/03/2018, 11/09/2022   • HPV Quadrivalent 02/20/2019   • Hepatitis A 06/18/2019   • Hpv9 04/22/2019, 02/22/2023   • Td 03/27/2003   • Tdap 02/22/2023       Health Maintenance   Topic Date Due   • HEPATITIS C SCREENING  Never done   • ANNUAL PHYSICAL  Never done   • COVID-19 Vaccine (4 - Booster for Moderna series) 03/25/2022   • PAP SMEAR  06/06/2025   • TDAP/TD VACCINES (3 - Td or Tdap) 02/22/2033   • INFLUENZA VACCINE  Completed   • Pneumococcal Vaccine 0-64  Aged Out            Assessment and Plan    Diagnoses and all orders for this visit:    1. Well adult exam (Primary)  -     CBC (No Diff); Future  -     Comprehensive Metabolic Panel; Future  -     Lipid Panel; Future  -     TSH; Future  Return when fasting to check labs.  2. Morbid obesity (HCC)  -     Semaglutide-Weight Management (Wegovy) 0.25 MG/0.5ML solution auto-injector; Inject 0.25 mg under the skin into the appropriate area as directed 1 (One) Time Per Week.  Dispense: 0.5 mL; Refill: 0  Patient is interested in medication management.   She has tried phentermine in the past.  She has made dietary changes and attempted to increase her physical activity with walking but limited by knee pain.  Recommend Wegovy for appetite control.  Discussed mechanism of action and side effects to monitor for.  Discussed titration of dose over time.  Schedule follow-up visit once she has been on the medication for 4 weeks.  3. Need for vaccination  -     HPV Vaccine  -     Tdap Vaccine Greater Than or Equal To 6yo IM    4. Migraine with aura and without status migrainosus, not intractable  Patient had side effects with amitriptyline.  At this time her migraines have decreased in frequency and has relief with Imitrex.  Consider using topiramate for migraine control if not improving by next visit.      Counseling/anticipatory guidance: Nutrition, physical activity, healthy weight, immunizations      Follow Up   Return in about 6 weeks (around 4/5/2023) for Office visit migraines and obesity.  Patient was given instructions and counseling regarding her condition or for health maintenance advice. Please see specific information pulled into the AVS if appropriate.      Electronically signed by Pamela Pelletier MD, 02/22/23, 4:14 PM EST.

## 2023-03-17 ENCOUNTER — OFFICE VISIT (OUTPATIENT)
Dept: FAMILY MEDICINE CLINIC | Facility: CLINIC | Age: 33
End: 2023-03-17
Payer: COMMERCIAL

## 2023-03-17 VITALS
OXYGEN SATURATION: 98 % | HEART RATE: 92 BPM | DIASTOLIC BLOOD PRESSURE: 70 MMHG | SYSTOLIC BLOOD PRESSURE: 112 MMHG | WEIGHT: 221.4 LBS | HEIGHT: 62 IN | BODY MASS INDEX: 40.74 KG/M2

## 2023-03-17 DIAGNOSIS — G43.109 MIGRAINE WITH AURA AND WITHOUT STATUS MIGRAINOSUS, NOT INTRACTABLE: Primary | ICD-10-CM

## 2023-03-17 DIAGNOSIS — E66.9 OBESITY (BMI 30-39.9): ICD-10-CM

## 2023-03-17 PROCEDURE — 99214 OFFICE O/P EST MOD 30 MIN: CPT | Performed by: FAMILY MEDICINE

## 2023-03-17 RX ORDER — ONDANSETRON 4 MG/1
4 TABLET, FILM COATED ORAL EVERY 8 HOURS PRN
Qty: 30 TABLET | Refills: 3 | Status: SHIPPED | OUTPATIENT
Start: 2023-03-17

## 2023-03-17 RX ORDER — TOPIRAMATE 25 MG/1
25 TABLET ORAL 2 TIMES DAILY
Qty: 60 TABLET | Refills: 11 | Status: SHIPPED | OUTPATIENT
Start: 2023-03-17

## 2023-03-17 RX ORDER — SUMATRIPTAN 50 MG/1
50 TABLET, FILM COATED ORAL
Qty: 9 TABLET | Refills: 3 | Status: SHIPPED | OUTPATIENT
Start: 2023-03-17

## 2023-03-17 RX ORDER — SEMAGLUTIDE 0.5 MG/.5ML
0.5 INJECTION, SOLUTION SUBCUTANEOUS WEEKLY
Qty: 0.5 ML | Refills: 0 | Status: SHIPPED | OUTPATIENT
Start: 2023-03-17

## 2023-03-17 NOTE — PROGRESS NOTES
"Chief Complaint  Obesity and Migraine    Subjective        Dulce Ospina presents to [facility]  History of Present Illness     She has used Wegovy for 4 doses. She can't eat a lot. She got sick 2 times when she ate too much.      Migraines up to 3 times a week. She has speckles she sees. Tension in her ears and temples like jackhammer. Associated with nausea.         Objective   Vital Signs:  /70   Pulse 92   Ht 157.5 cm (62.01\")   Wt 100 kg (221 lb 6.4 oz)   SpO2 98%   BMI 40.48 kg/m²   Estimated body mass index is 40.48 kg/m² as calculated from the following:    Height as of this encounter: 157.5 cm (62.01\").    Weight as of this encounter: 100 kg (221 lb 6.4 oz).       Class 3 Severe Obesity (BMI >=40). Obesity-related health conditions include the following: none. Obesity is improving with treatment. BMI is is above average; BMI management plan is completed. We discussed pharmacologic options including wegovy.      Physical Exam  Vitals reviewed.   Constitutional:       General: She is not in acute distress.     Appearance: She is obese. She is not ill-appearing.   Cardiovascular:      Rate and Rhythm: Normal rate and regular rhythm.   Pulmonary:      Effort: Pulmonary effort is normal.      Breath sounds: Normal breath sounds.   Neurological:      Mental Status: She is alert.        Result Review :                   Assessment and Plan   Diagnoses and all orders for this visit:    1. Migraine with aura and without status migrainosus, not intractable (Primary)  -     topiramate (Topamax) 25 MG tablet; Take 1 tablet by mouth 2 (Two) Times a Day.  Dispense: 60 tablet; Refill: 11  -     ondansetron (Zofran) 4 MG tablet; Take 1 tablet by mouth Every 8 (Eight) Hours As Needed for Nausea or Vomiting.  Dispense: 30 tablet; Refill: 3  -     SUMAtriptan (IMITREX) 50 MG tablet; Take 1 tablet by mouth Every 2 (Two) Hours As Needed for Migraine. Max dose 200 mg per day  " Dispense: 9 tablet; Refill: 3  Uncontrolled.  At this time start preventative with Topamax.  Monitor for side effects.  Continue to use Imitrex as needed.  Zofran for nausea.  2. Obesity (BMI 30-39.9)  -     Semaglutide-Weight Management (Wegovy) 0.5 MG/0.5ML solution auto-injector; Inject 0.5 mL under the skin into the appropriate area as directed 1 (One) Time Per Week.  Dispense: 0.5 mL; Refill: 0  Starting weight 227 pounds with Wegovy.  Increase to Wegovy 0.5 mg x 4 doses.  Then increase once a month to a maximum 2.4 mg.           Follow Up   Return in about 3 months (around 6/17/2023) for Office visit obesity.  Patient was given instructions and counseling regarding her condition or for health maintenance advice. Please see specific information pulled into the AVS if appropriate.     Electronically signed by Pamela Pelletier MD, 03/17/23, 4:13 PM EDT.

## 2023-03-20 ENCOUNTER — PRIOR AUTHORIZATION (OUTPATIENT)
Dept: FAMILY MEDICINE CLINIC | Facility: CLINIC | Age: 33
End: 2023-03-20
Payer: COMMERCIAL

## 2023-03-20 NOTE — TELEPHONE ENCOUNTER
Contacted pt to notify pt states she has two insurance plans and her pharmacy was able to bill it to her other insurance and did not need a PA. She already has the medication and will continue for now.

## 2023-03-20 NOTE — TELEPHONE ENCOUNTER
(Key: [id number])  Med:Wegovy 0.5MG/0.5ML auto-injectors  Created:03/20/2023  Message from Plan  This drug/product is not covered under the pharmacy benefit. Prior Authorization is not available.

## 2023-03-29 RX ORDER — DROSPIRENONE 4 MG/1
TABLET, FILM COATED ORAL
Qty: 28 TABLET | Refills: 2 | Status: SHIPPED | OUTPATIENT
Start: 2023-03-29

## 2023-04-25 ENCOUNTER — TELEPHONE (OUTPATIENT)
Dept: FAMILY MEDICINE CLINIC | Facility: CLINIC | Age: 33
End: 2023-04-25
Payer: COMMERCIAL

## 2023-04-25 DIAGNOSIS — E66.9 OBESITY (BMI 30-39.9): ICD-10-CM

## 2023-04-25 RX ORDER — SEMAGLUTIDE 0.5 MG/.5ML
0.5 INJECTION, SOLUTION SUBCUTANEOUS WEEKLY
Qty: 0.5 ML | Refills: 0 | Status: CANCELLED | OUTPATIENT
Start: 2023-04-25

## 2023-04-25 RX ORDER — SEMAGLUTIDE 0.25 MG/.5ML
0.25 INJECTION, SOLUTION SUBCUTANEOUS WEEKLY
Qty: 0.5 ML | Refills: 0 | Status: SHIPPED | OUTPATIENT
Start: 2023-04-25 | End: 2023-04-25

## 2023-04-25 RX ORDER — SEMAGLUTIDE 1 MG/.5ML
1 INJECTION, SOLUTION SUBCUTANEOUS WEEKLY
Qty: 0.5 ML | Refills: 0 | Status: SHIPPED | OUTPATIENT
Start: 2023-04-25

## 2023-04-25 NOTE — TELEPHONE ENCOUNTER
Monitor for signs of increased abdominal pain, nausea and vomiting when going without medications and starting the higher dose.  I sent a prescription to your pharmacy.

## 2023-04-25 NOTE — TELEPHONE ENCOUNTER
Pt called the office back and the message was relayed. Pt wants to know why she has to start back on the .25. She states she had already completed the .25 and just completed the 0.5 on 4/15. She states her insurance will be active Monday 5/1. She states at that time she would have only been without for 2 weeks. Pt is requesting to go up to next dose.

## 2023-04-25 NOTE — TELEPHONE ENCOUNTER
Patient administered last dose of Wegovy on 4/13, she is requesting refills    She states she has been experiencing constipation since starting Wegovy, she typically uses Miralax which helps some. She is asking if there are any other prescription options?

## 2023-04-25 NOTE — TELEPHONE ENCOUNTER
I recommend either continuing the MiraLAX or using docusate sodium.  They are over-the-counter medications.  Since you have been without the Wegovy you need to restart at 0.25 mg for 4 doses before using Wegovy 0.5 mg.

## 2023-04-25 NOTE — TELEPHONE ENCOUNTER
Caller: Dulce Ospina Emely    Relationship: Self    Best call back number: [phone]    Requested Prescriptions:   Requested Prescriptions     Pending Prescriptions Disp Refills   • Semaglutide-Weight Management (Wegovy) 0.5 MG/0.5ML solution auto-injector 0.5 mL 0     Sig: Inject 0.5 mL under the skin into the appropriate area as directed 1 (One) Time Per Week.        Pharmacy where request should be sent: [address] [phone] [facility] [phone]      Last office visit with prescribing clinician: 3/17/2023   Last telemedicine visit with prescribing clinician: 6/20/2023   Next office visit with prescribing clinician: 6/20/2023     Additional details provided by patient:   PATIENT STATES THAT SHE WAS INSTRUCTED TO LET DR CRUZ KNOW HOW THE WEGOVY MEDICATION HAS BEEN WORKING AND TO REQUEST A REFILL.     SHE STATES THAT SHE WOULD HAVE REQUESTED THIS SOONER BUT SHE WAS UNABLE TO  ANOTHER PRESCRIPTION FOR THE MONTH OF April DUE TO A GAP IN HER INSURANCE WHICH WILL START 5/1/23    PATIENT ALSO STATES THAT SHE HAS BEEN HAVING SOME SLIGHT CONSTIPATION WHICH SHE IS TREATING WITH OVER THE COUNTER SUPPLEMENTS AT MAX STRENGTH. SHE WONDERS IF DR CRUZ COULD PRESCRIBE SOMETHING TO HELP WITH THIS.    PATIENT'S LAST DOSE WAS ON 4/13/23 AND HER MOST RECENT WEIGH IN WAS TODAY 4/25/23 AT 217LBS    Does the patient have less than a 3 day supply:  [x] Yes  [] No    Would you like a call back once the refill request has been completed: [x] Yes [] No    If the office needs to give you a call back, can they leave a voicemail: [x] Yes [] No    Cyndi Russo Rep   04/25/23 11:56 EDT

## 2023-04-25 NOTE — TELEPHONE ENCOUNTER
Attempted to contact patient, no answer. Left voicemail for patient to call the office back (office # given).     Hub may relay message & document.    Please notify pt that the increased dose has been sent. She will need to watch out for signs of increased abdominal pain, nausea and vomiting when going without medications and starting the higher dose.  The Wegovy 1mg/0.5ml pen has been sent to [facility] [address] [place] [address] [phone] PH.

## 2023-05-02 ENCOUNTER — TELEPHONE (OUTPATIENT)
Dept: FAMILY MEDICINE CLINIC | Facility: CLINIC | Age: 33
End: 2023-05-02
Payer: COMMERCIAL

## 2023-05-02 RX ORDER — NALTREXONE HYDROCHLORIDE AND BUPROPION HYDROCHLORIDE 8; 90 MG/1; MG/1
TABLET, EXTENDED RELEASE ORAL
Qty: 70 TABLET | Refills: 0 | Status: SHIPPED | OUTPATIENT
Start: 2023-05-02

## 2023-05-02 NOTE — TELEPHONE ENCOUNTER
Caller: Dulce Ospina    Relationship: Self    Best call back number: [phone]    What is the best time to reach you: ANYTIME    Who are you requesting to speak with (clinical staff, provider,  specific staff member): DR CRUZ    What was the call regarding: INSURANCE WILL NOT COVER THE WEGOVEY, ARE THERE ANY OTHER OPTIONS?    Do you require a callback: YES

## 2023-05-02 NOTE — TELEPHONE ENCOUNTER
Another option for weight loss is the medication Contrave which contains naltrexone and bupropion.  It is a pill that you will take 1-2 times a day.  It can be particularly helpful with craving and emotional style eating.  If it is also not covered by insurance there is a mail-order pharmacy which offers it for $99 a month.  If you are interested I will send the prescription to [facility] pharmacy to see if it is covered.

## 2023-05-03 ENCOUNTER — PRIOR AUTHORIZATION (OUTPATIENT)
Dept: FAMILY MEDICINE CLINIC | Facility: CLINIC | Age: 33
End: 2023-05-03
Payer: COMMERCIAL

## 2023-05-03 ENCOUNTER — TELEPHONE (OUTPATIENT)
Dept: FAMILY MEDICINE CLINIC | Facility: CLINIC | Age: 33
End: 2023-05-03
Payer: COMMERCIAL

## 2023-05-03 NOTE — TELEPHONE ENCOUNTER
Caller: BhaveshDulce dietz    Relationship: Self    Best call back number: [phone]    What medications are you currently taking:   Current Outpatient Medications on File Prior to Visit   Medication Sig Dispense Refill   • cetirizine (zyrTEC) 10 MG tablet Take 1 tablet by mouth Daily As Needed.     • meloxicam (MOBIC) 15 MG tablet Take 1 tablet by mouth Daily. 30 tablet 2   • naltrexone-bupropion ER (Contrave) 8-90 MG tablet 1 tab PO daily for 7 days, then 1 tab PO BID for 7 days, then 2 tabs PO QAM and 1 tab PO QPM for 7 days, then 2 tabs PO BID 70 tablet 0   • ondansetron (Zofran) 4 MG tablet Take 1 tablet by mouth Every 8 (Eight) Hours As Needed for Nausea or Vomiting. 30 tablet 3   • promethazine (PHENERGAN) 25 MG tablet Take 1 tablet by mouth Every 4 (Four) Hours As Needed for Nausea or Vomiting. 6 tablet 0   • Slynd 4 MG tablet Take 1 tablet by mouth once daily 28 tablet 2   • SUMAtriptan (IMITREX) 50 MG tablet Take 1 tablet by mouth Every 2 (Two) Hours As Needed for Migraine. Max dose 200 mg per day 9 tablet 3   • topiramate (Topamax) 25 MG tablet Take 1 tablet by mouth 2 (Two) Times a Day. 60 tablet 11     No current facility-administered medications on file prior to visit.          Which medication are you concerned about: CONTRAVE    Who prescribed you this medication: DR. RAPP    What are your concerns: PATIENT WOULD LIKE TO START THE PRIOR AUTHORIZATION PROCESS FOR THIS.    How long have you had these concerns: YES

## 2023-05-03 NOTE — TELEPHONE ENCOUNTER
Patient provided updated insurance info,     BIN [id number]  [facility] [id number]  [facility] [id number]  ID [id number]    She requested Wegovy PA to be resubmitted with updated insurance info as she prefers this to Contrave.

## 2023-05-03 NOTE — TELEPHONE ENCOUNTER
Caller: Dulce Ospina    Relationship: Self    Best call back number: [phone]    What was the call regarding: naltrexone-bupropion ER (Contrave) 8-90 MG tablet    PATIENTS INSURANCE WILL NOT COVER THIS MEDICATION, PATIENT IS WONDERING IF THERE IS ANY OTHER OPTION.     Do you require a callback: YES

## 2023-05-10 ENCOUNTER — TELEPHONE (OUTPATIENT)
Dept: FAMILY MEDICINE CLINIC | Facility: CLINIC | Age: 33
End: 2023-05-10
Payer: COMMERCIAL

## 2023-05-10 NOTE — TELEPHONE ENCOUNTER
Caller: Dulce Ospina    Relationship: Self    Best call back number: [phone]    What is the best time to reach you: ANY TIME    Who are you requesting to speak with (clinical staff, provider,  specific staff member): DR CRUZ    Do you know the name of the person who called: SELF    What was the call regarding: PATIENT HAS HAD A BAD MIGRAINE SINCE YESTERDAY AND WOULD LIKE TO COME IN FOR A TORADOL SHOT. PLEASE ADVISE    Do you require a callback: YES

## 2023-06-06 RX ORDER — DROSPIRENONE 4 MG/1
1 TABLET, FILM COATED ORAL DAILY
Qty: 28 TABLET | Refills: 1 | Status: SHIPPED | OUTPATIENT
Start: 2023-06-06

## 2023-06-21 PROBLEM — R73.01 ELEVATED FASTING GLUCOSE: Status: ACTIVE | Noted: 2023-06-20

## 2023-07-27 ENCOUNTER — TELEPHONE (OUTPATIENT)
Dept: FAMILY MEDICINE CLINIC | Facility: CLINIC | Age: 33
End: 2023-07-27
Payer: COMMERCIAL

## 2023-07-27 ENCOUNTER — OFFICE VISIT (OUTPATIENT)
Dept: FAMILY MEDICINE CLINIC | Facility: CLINIC | Age: 33
End: 2023-07-27
Payer: COMMERCIAL

## 2023-07-27 VITALS
WEIGHT: 223.8 LBS | DIASTOLIC BLOOD PRESSURE: 82 MMHG | HEART RATE: 84 BPM | HEIGHT: 62 IN | BODY MASS INDEX: 41.18 KG/M2 | OXYGEN SATURATION: 97 % | SYSTOLIC BLOOD PRESSURE: 118 MMHG

## 2023-07-27 DIAGNOSIS — B08.4 HAND, FOOT AND MOUTH DISEASE: Primary | ICD-10-CM

## 2023-07-27 PROCEDURE — 99213 OFFICE O/P EST LOW 20 MIN: CPT | Performed by: NURSE PRACTITIONER

## 2023-07-27 NOTE — TELEPHONE ENCOUNTER
Caller: Dulce Ospina    Relationship: Self    Best call back number: [phone]     What was the call regarding: PATIENT HAS BEEN EXPOSED TO HAND FOOT AND MOUTH DISEASE, PATIENT HAD BLISTERS BUT WENT AWAY. NEW BLISTERS HAVE APPEARED ON HER SHOULDERS, NEEDS TO KNOW IF THOSE COULD BE RELATED AND IF SHE SHOULD STAY HOME FROM WORK, AND FOR HOW LONG.     Is it okay if the provider responds through MyChart: CALL

## 2023-07-27 NOTE — ASSESSMENT & PLAN NOTE
Hand, foot, and mouth disease      Medications: Domeboro solution.  Verbal patient instruction given.  Follow-up as needed.

## 2023-07-27 NOTE — LETTER
July 27, 2023     Patient: Dulce Ospina   YOB: 1990   Date of Visit: 7/27/2023       To Whom It May Concern:    It is my medical opinion that Dulce Ospina may return to work on 8/2/23 .           Sincerely,        GAVIN Sanders    CC:   No Recipients

## 2023-07-27 NOTE — PROGRESS NOTES
"Chief Complaint  Blister (She states she was exposed to hand foot and mouth last week and then she had blisters pop up on her finger and her toe and tongue and now those are gone. Now she has some on her shoulders and her nose.)    Subjective      History of Present Illness  Dulce is a 32 y.o. female who presents to the clinic today for evaluation of a rash involving the  shoulder. Rash started 1 week ago. Lesions are clear, and blistering in texture. Rash has changed over time. Rash causes no discomfort. Associated symptoms: myalgia and sore throat. Patient denies: abdominal pain, fever, nausea, and vomiting. Patient has had contacts with similar rash. Patient has not had new exposures (soaps, lotions, laundry detergents, foods, medications, plants, insects or animals). Exposed at work.    The following portions of the patient's history were reviewed and updated as appropriate: allergies, current medications, past family history, past medical history, past social history, past surgical history, and problem list.    Review of Systems  Pertinent items are noted in HPI.        Objective   Vital Signs:  /82   Pulse 84   Ht 157.5 cm (62.01\")   Wt 102 kg (223 lb 12.8 oz)   SpO2 97%   BMI 40.92 kg/m²   Estimated body mass index is 40.92 kg/m² as calculated from the following:    Height as of this encounter: 157.5 cm (62.01\").    Weight as of this encounter: 102 kg (223 lb 12.8 oz).            Physical Exam  Vitals reviewed.   Constitutional:       General: She is not in acute distress.  HENT:      Head: Normocephalic and atraumatic.   Eyes:      Conjunctiva/sclera: Conjunctivae normal.   Cardiovascular:      Rate and Rhythm: Normal rate.   Pulmonary:      Effort: Pulmonary effort is normal.   Musculoskeletal:      Cervical back: Normal range of motion and neck supple.   Skin:     Findings: Rash present.          Neurological:      General: No focal deficit present.      Mental Status: She is alert. "   Psychiatric:         Mood and Affect: Mood normal.         Behavior: Behavior normal.         Thought Content: Thought content normal.         Judgment: Judgment normal.        Result Review                    Assessment and Plan  Diagnoses and all orders for this visit:    1. Hand, foot and mouth disease (Primary)  Assessment & Plan:    Hand, foot, and mouth disease      Medications: Domeboro solution.  Verbal patient instruction given.  Follow-up as needed.                          Follow Up  Return if symptoms worsen or fail to improve.  Patient was given instructions and counseling regarding her condition or for health maintenance advice. Please see specific information pulled into the AVS if appropriate.    Part of this note may be an electronic transcription/translation of spoken language to printed text using the Dragon Dictation System.

## 2023-07-27 NOTE — TELEPHONE ENCOUNTER
Attempted to contact patient, no answer. Left voicemail for patient to call the office back (office # given).     Hub may relay message and schedule appointment.    Pt needs an appt. PCP is out so she will need to see another provider in office if available or do a walk-in at urgent care.

## 2023-08-08 RX ORDER — DROSPIRENONE 4 MG/1
1 TABLET, FILM COATED ORAL DAILY
Qty: 28 TABLET | Refills: 0 | Status: SHIPPED | OUTPATIENT
Start: 2023-08-08

## 2023-09-18 ENCOUNTER — TREATMENT (OUTPATIENT)
Dept: PHYSICAL THERAPY | Facility: CLINIC | Age: 33
End: 2023-09-18
Payer: COMMERCIAL

## 2023-09-18 ENCOUNTER — OFFICE VISIT (OUTPATIENT)
Age: 33
End: 2023-09-18
Payer: COMMERCIAL

## 2023-09-18 VITALS
SYSTOLIC BLOOD PRESSURE: 118 MMHG | WEIGHT: 227.07 LBS | BODY MASS INDEX: 40.23 KG/M2 | DIASTOLIC BLOOD PRESSURE: 78 MMHG | HEIGHT: 63 IN

## 2023-09-18 DIAGNOSIS — S62.315A CLOSED DISPLACED FRACTURE OF BASE OF FOURTH METACARPAL BONE OF LEFT HAND, INITIAL ENCOUNTER: Primary | ICD-10-CM

## 2023-09-18 RX ORDER — DROSPIRENONE 4 MG/1
TABLET, FILM COATED ORAL
COMMUNITY

## 2023-09-18 NOTE — PROGRESS NOTES
"                                                                 [facility]     Office Visit       Date: 09/18/2023   Patient Name: Dulce Ospina  MRN: [id number]  YOB: 1990    Referring Physician: Laila Porter APRN     Chief Complaint:   Chief Complaint   Patient presents with    Left Hand - Pain     DOI: 9/16/2023       History of Present Illness:   Dulce Ospina is a 32 y.o. female right-hand-dominant presenting to clinic as new patient complains of left hand pain.  Patient sustained a mechanical fall from stand, DOI 9/16/2023.  She presented to an urgent care 1 day ago where x-rays were obtained.  She was placed in a splint instructed to follow-up.  She reports the pain and swelling mostly localized to the ulnar and dorsal aspect of the hand.  Pain is made worse with gripping and carrying objects.  No numbness or tingling.  Denies any other injuries.    Subjective   Review of Systems:   Review of Systems   Constitutional: Negative.    HENT: Negative.     Eyes: Negative.    Respiratory: Negative.     Cardiovascular: Negative.    Gastrointestinal: Negative.    Endocrine: Negative.    Genitourinary: Negative.    Musculoskeletal:  Positive for arthralgias.   Skin: Negative.    Allergic/Immunologic: Negative.    Neurological: Negative.    Hematological: Negative.    Psychiatric/Behavioral: Negative.        Pertinent review of systems per HPI    I reviewed the patient's chief complaint, history of present illness, review of systems, past medical history, surgical history, family history, social history, medications and allergy list.    Objective    Vital Signs:   Vitals:    09/18/23 1307   BP: 118/78   Weight: 103 kg (227 lb 1.2 oz)   Height: 160 cm (62.99\")     General: No acute distress. Alert and oriented.     Ortho Exam:  Examination of the left upper extremity demonstrates diffuse swelling and ecchymosis localized to the dorsal ulnar aspect of the hand.  There is " tenderness to palpation at the fourth metacarpal base.  Patient is able to make a half fist, limited secondary to pain.  There appears to be no malrotation of the digits with a normal cascade point to the scaphoid tubercle.  The remainder of the hand is nontender.  Sensation intact to light touch throughout the hand.  Warm and well-perfused distally.    Imaging / Studies:    Imaging Results (Last 24 Hours)       ** No results found for the last 24 hours. **        Left hand x-rays obtained on 9/17/2023 are personally reviewed.  These demonstrate evidence of a oblique displaced fracture at the fourth metacarpal base.  There is approximately 3 mm of shortening noted.    Procedures:  Procedures    Assessment / Plan    Assessment/Plan:   Dulce Spears a 32 y.o. female with left fourth metacarpal base fracture, DOI 9/16/2023.    There are no diagnoses linked to this encounter.     I discussed with the patient their clinical and radiographic findings are consistent with a fourth metacarpal base fracture.  We had a lengthy discussion regarding the pathophysiology of their diagnosis where x-rays were reviewed and explained.  Fracture alignment is within acceptable parameters for nonoperative management.  There is no malrotation noted on examination, therefore we will treat this fracture conservatively with immobilization.  A prescription was provided for a custom occupational therapy ulnar gutter splint.  She will wear this at all times for 4 weeks.  She return to clinic in 4 weeks time for repeat x-rays.  At that time we will initiate a hand therapy for range of motion of the digits out of the splint.  I anticipate 6 to 8 weeks of immobilization to fracture healing.  They were agreeable with the plan.  All questions and concerns were addressed.     Follow Up:   Return in about 4 weeks (around 10/16/2023) for Follow Up with repeat x-rays left hand..      Payton Arana MD  [facility] & Hand Surgeon

## 2023-09-19 DIAGNOSIS — Z09 FRACTURE FOLLOW-UP: Primary | ICD-10-CM

## 2023-09-19 NOTE — PROGRESS NOTES
[facility]  [address], [address]  [place] [address]           Dulce Ospina 1990     Diagnosis/ Surgery: Left closed displaced fracture of base of fourth metacarpal    Date Of Onset: 9/16/23      Date Of Surgery:N/A    Hand Dominance: Right  History of Present Condition: fell onto concrete with outstretched hand   Medical/Vocational History/ Medications: works in a [employer] office as a      Pain: 0/10 at rest       Edema: minimal   Sensibility: WNL   Wound Status: light bruising   ROM/ Strength/Test: N/A    Splinting:  Patient was measured and fit with a custom fabricated forearm based ulnar gutter with MCPJ's at 70 degrees flexion and IPJ's free.    Patient was instructed in wearing schedule, precautions and care of the splint during this visit.   Patient was instructed in proper donning/doffing of splint.   Assessment:  Patient was fitted and appropriate splint was fabricated this date.  Patient reported that splint was comfortable and had no complications with the fit of the splint.  Patient was instructed and patient verbalized understanding of precautions, wear and care of the splint.   Patient demonstrated independent donning/doffing of splint during treatment today.  Goals:  Patient was fitted properly with appropriate splint for diagnosis  Patient was educated on precautions, wear schedule and care of splint  Patient demonstrated independence with donning/doffing of the splint.  Splint was provided to Protect Healing Structures, Restrict Mobility, Improve joint alignment.  Plan:  No additional treatment is required for this patient at this time. The patient is therefore discharged from therapy.  Patient advised to contact therapist with any additional questions or concerns regarding the fit and function of the splint.  Patient will be seen for splint issues as needed   Wear Instructions: Off for hygiene       PT SIGNATURE: RENETTA Garsia, PT, DPT  License  Number: [id number]  Electronically signed by RENETTA Garsia, PT, 09/19/23, 1:15 PM EDT    PHYSICIAN: Payton Arana MD      DATE:     Please sign and return via fax to  .. Thank you, [facility].

## 2024-06-18 ENCOUNTER — TRANSCRIBE ORDERS (OUTPATIENT)
Dept: ADMINISTRATIVE | Facility: HOSPITAL | Age: 34
End: 2024-06-18
Payer: COMMERCIAL

## 2024-06-18 DIAGNOSIS — Z80.8 FAMILY HISTORY OF THYROID CANCER: Primary | ICD-10-CM

## 2024-06-25 ENCOUNTER — HOSPITAL ENCOUNTER (OUTPATIENT)
Dept: ULTRASOUND IMAGING | Facility: HOSPITAL | Age: 34
Discharge: HOME | End: 2024-06-25
Admitting: NURSE PRACTITIONER
Payer: COMMERCIAL

## 2024-06-25 DIAGNOSIS — Z80.8 FAMILY HISTORY OF THYROID CANCER: ICD-10-CM

## 2024-06-25 PROCEDURE — 76536 US EXAM OF HEAD AND NECK: CPT

## 2025-02-08 ENCOUNTER — HOSPITAL ENCOUNTER (EMERGENCY)
Facility: HOSPITAL | Age: 35
Discharge: HOME | End: 2025-02-08
Attending: STUDENT IN AN ORGANIZED HEALTH CARE EDUCATION/TRAINING PROGRAM
Payer: COMMERCIAL

## 2025-02-08 VITALS
BODY MASS INDEX: 34.23 KG/M2 | HEIGHT: 64 IN | OXYGEN SATURATION: 96 % | SYSTOLIC BLOOD PRESSURE: 103 MMHG | HEART RATE: 84 BPM | DIASTOLIC BLOOD PRESSURE: 69 MMHG | TEMPERATURE: 97.7 F | WEIGHT: 200.5 LBS | RESPIRATION RATE: 18 BRPM

## 2025-02-08 DIAGNOSIS — T78.40XA ALLERGIC REACTION, INITIAL ENCOUNTER: Primary | ICD-10-CM

## 2025-02-08 DIAGNOSIS — B09 VIRAL EXANTHEM: ICD-10-CM

## 2025-02-08 LAB
FLUAV RNA RESP QL NAA+PROBE: NOT DETECTED
FLUBV RNA RESP QL NAA+PROBE: NOT DETECTED
HETEROPH AB SER QL LA: NEGATIVE
RSV RNA RESP QL NAA+PROBE: NOT DETECTED
S PYO AG THROAT QL: NEGATIVE
SARS-COV-2 RNA RESP QL NAA+PROBE: NOT DETECTED

## 2025-02-08 PROCEDURE — 86308 HETEROPHILE ANTIBODY SCREEN: CPT | Performed by: PHYSICIAN ASSISTANT

## 2025-02-08 PROCEDURE — 96372 THER/PROPH/DIAG INJ SC/IM: CPT

## 2025-02-08 PROCEDURE — 87637 SARSCOV2&INF A&B&RSV AMP PRB: CPT | Performed by: PHYSICIAN ASSISTANT

## 2025-02-08 PROCEDURE — 63710000001 DIPHENHYDRAMINE PER 50 MG: Performed by: PHYSICIAN ASSISTANT

## 2025-02-08 PROCEDURE — 99283 EMERGENCY DEPT VISIT LOW MDM: CPT

## 2025-02-08 PROCEDURE — 87081 CULTURE SCREEN ONLY: CPT | Performed by: PHYSICIAN ASSISTANT

## 2025-02-08 PROCEDURE — 87880 STREP A ASSAY W/OPTIC: CPT | Performed by: PHYSICIAN ASSISTANT

## 2025-02-08 PROCEDURE — 25010000002 DEXAMETHASONE PER 1 MG: Performed by: PHYSICIAN ASSISTANT

## 2025-02-08 RX ORDER — DIPHENHYDRAMINE HCL 25 MG
25 CAPSULE ORAL ONCE
Status: COMPLETED | OUTPATIENT
Start: 2025-02-08 | End: 2025-02-08

## 2025-02-08 RX ORDER — DEXAMETHASONE SODIUM PHOSPHATE 10 MG/ML
10 INJECTION INTRAMUSCULAR; INTRAVENOUS ONCE
Status: COMPLETED | OUTPATIENT
Start: 2025-02-08 | End: 2025-02-08

## 2025-02-08 RX ADMIN — DEXAMETHASONE SODIUM PHOSPHATE 10 MG: 10 INJECTION INTRAMUSCULAR; INTRAVENOUS at 20:41

## 2025-02-08 RX ADMIN — DIPHENHYDRAMINE HYDROCHLORIDE 25 MG: 25 CAPSULE ORAL at 19:48

## 2025-02-09 NOTE — [facility] PROVIDER NOTE
Subjective  History of Present Illness:    Patient is a 34-year-old female with medical history of hidradenitis suppurativa, IBS, migraines, presents emergency department for evaluation of possible allergic reaction.  Patient states last night she developed erythema and pruritus around neck which is continued today concerning her for allergic reaction.  Patient states she also feels like she is developing upper respiratory infection as she has mild pharyngitis, cough, fatigue.  Patient states she has had allergic reactions in the past without known trigger.  Patient denies history of anaphylaxis.  Patient denies chest pain, dyspnea, dizziness, syncope.  Patient took Benadryl this morning as well as Tylenol without significant symptom relief.      Nurses Notes reviewed and agree, including vitals, allergies, social history and prior medical history.     REVIEW OF SYSTEMS: All systems reviewed and not pertinent unless noted.  Review of Systems   Constitutional:  Positive for chills and fatigue.   HENT:  Positive for sore throat.    Respiratory:  Positive for cough.    Skin:  Positive for rash.   All other systems reviewed and are negative.      Past Medical History:   Diagnosis Date    Adult acne     Elevated fasting glucose 06/20/2023    Hidradenitis     IBS (irritable bowel syndrome)     Migraine with aura     Migraine with aura     MRSA (methicillin resistant Staphylococcus aureus)     Overweight        Allergies:    Amitriptyline, Cephalexin, Nifedipine, Prednisone, Sulfa antibiotics, and Sulfamethoxazole-trimethoprim      Past Surgical History:   Procedure Laterality Date    TONSILLECTOMY      WISDOM TOOTH EXTRACTION           Social History     Socioeconomic History    Marital status: Single   Tobacco Use    Smoking status: Never    Smokeless tobacco: Never   Vaping Use    Vaping status: Never Used   Substance and Sexual Activity    Alcohol use: Yes     Comment: social    Drug use: No    Sexual activity: Yes      "Partners: Male     Birth control/protection: OCP, Pill         Family History   Problem Relation Age of Onset    Hypertension Mother     Hemophilia Father     Hepatitis Father     Thyroid disease Paternal Aunt     Arthritis Maternal Grandmother     Colon cancer Maternal Grandmother     Arthritis Maternal Grandfather     Cancer Maternal Grandfather     Arthritis Paternal Grandmother     Lung cancer Paternal Grandmother     Kidney cancer Paternal Grandmother     Diabetes Paternal Grandmother     Arthritis Paternal Grandfather     Diabetes Paternal Grandfather     Heart attack Paternal Grandfather     Diabetes Other     Hypertension Other        Objective  Physical Exam:  /86   Pulse 76   Temp 97.7 °F (36.5 °C) (Oral)   Resp 18   Ht 162.6 cm (64\")   Wt 90.9 kg (200 lb 8 oz)   SpO2 100%   BMI 34.42 kg/m²      Physical Exam  Vitals and nursing note reviewed.   Constitutional:       General: She is not in acute distress.     Appearance: Normal appearance. She is not toxic-appearing.   HENT:      Head: Normocephalic and atraumatic.      Nose: Nose normal.      Mouth/Throat:      Mouth: Mucous membranes are moist.      Pharynx: No oropharyngeal exudate or posterior oropharyngeal erythema.      Comments: Patent airway tolerating secretions.  No trismus, voice change, drooling  Eyes:      Extraocular Movements: Extraocular movements intact.      Conjunctiva/sclera: Conjunctivae normal.      Pupils: Pupils are equal, round, and reactive to light.   Cardiovascular:      Rate and Rhythm: Normal rate and regular rhythm.      Pulses: Normal pulses.      Heart sounds: Normal heart sounds. No murmur heard.  Pulmonary:      Effort: Pulmonary effort is normal. No respiratory distress.      Breath sounds: Normal breath sounds. No stridor. No wheezing.   Abdominal:      General: Abdomen is flat. There is no distension.      Palpations: Abdomen is soft.   Musculoskeletal:         General: No deformity. Normal range of " motion.      Cervical back: Normal range of motion and neck supple.      Right lower leg: No edema.      Left lower leg: No edema.   Skin:     General: Skin is warm and dry.      Capillary Refill: Capillary refill takes less than 2 seconds.      Findings: No rash.   Neurological:      General: No focal deficit present.      Mental Status: She is alert and oriented to person, place, and time.      Cranial Nerves: No cranial nerve deficit.      Sensory: No sensory deficit.      Gait: Gait normal.   Psychiatric:         Mood and Affect: Mood normal.         Behavior: Behavior normal.         Procedures    ED Course:         Lab Results (last 24 hours)       Procedure Component Value Units Date/Time    COVID PRE-OP / PRE-PROCEDURE SCREENING ORDER (NO ISOLATION) - Swab, Nasopharynx [[id number]]  (Normal) Collected: 02/08/25 1932    Specimen: Swab from Nasopharynx Updated: 02/08/25 2015    Narrative:      The following orders were created for panel order COVID PRE-OP / PRE-PROCEDURE SCREENING ORDER (NO ISOLATION) - Swab, Nasopharynx.  Procedure                               Abnormality         Status                     ---------                               -----------         ------                     COVID-19, FLU A/B, RSV P...[[id number]]  Normal              Final result                 Please view results for these tests on the individual orders.    COVID-19, FLU A/B, RSV PCR 1 HR TAT - Swab, Nasopharynx [[id number]]  (Normal) Collected: 02/08/25 1932    Specimen: Swab from Nasopharynx Updated: 02/08/25 2015     COVID19 Not Detected     Influenza A PCR Not Detected     Influenza B PCR Not Detected     RSV, PCR Not Detected    Narrative:      Fact sheet for providers: https://www.fda.gov/media/716087/download    Fact sheet for patients: https://www.fda.gov/media/978443/download    Test performed by PCR.    Rapid Strep A Screen - Swab, Throat [[id number]]  (Normal) Collected: 02/08/25 1948    Specimen: Swab from Throat  Updated: 02/08/25 2044     Strep A Ag Negative    Beta Strep Culture, Throat - Swab, Throat [[id number]] Collected: 02/08/25 1948    Specimen: Swab from Throat Updated: 02/08/25 2044    Mononucleosis Screen [[id number]]  (Normal) Collected: 02/08/25 1956    Specimen: Blood Updated: 02/08/25 2050     Monospot Negative             No radiology results from the last 24 hrs       MDM      Initial impression of presenting illness: Patient is 34 yr old female presents to the emergency department for evaluation of possible allergic reaction.  Patient has rash, erythema and pruritus of neck that started last night.  Patient also states she is starting to become sick with upper respiratory symptoms that started last night.    Patient arrives afebrile, hemodynamic stable, nontoxic.  With vitals interpreted by myself.     Pertinent features from physical exam: Erythematous macular rash that is pruritic on neck.  No involvement of lips, tongue, mucosal membranes.  No stridor, voice change, vomiting or nausea, tachycardia.  Patent airway tolerating secretions.    DDX: includes but is not limited to: Allergic reaction, viral syndrome, viral exanthem, strep pharyngitis, mono, others    Initial diagnostic plan and interventions: Workup includes COVID-19, influenza, strep test, monotest.  Interventions include Benadryl and Decadron.    Diagnostic information from other sources: Previous encounters    Results from initial plan were reviewed and interpreted by me revealing all testing is negative.    Re-evaluation: Patient continues remain hemodynamic stable nontoxic-appearing.  Patient does not exhibit findings concerning for anaphylaxis.  Patient is educated on supportive care for possible allergic reaction as well as viral exanthems.  Patient already has EpiPen's at home.  Patient given strict return precautions to the emergency department.      Medications   diphenhydrAMINE (BENADRYL) capsule 25 mg (25 mg Oral Given 2/8/25 1948)    dexAMETHasone (DECADRON) injection 10 mg (10 mg Intramuscular Given 2/8/25 2041)       Results/clinical rationale were discussed with patient    Data interpreted: Nursing notes reviewed, vital signs reviewed.  Labs independently interpreted by me.  Imaging independently interpreted by me.  EKG independently interpreted by me.     Counseling: Discussed the results above with the patient regarding need for admission or discharge.  Patient understands and agrees plan of care.      -----  ED Disposition       ED Disposition   Discharge    Condition   Stable    Comment   --             Final diagnoses:   Allergic reaction, initial encounter   Viral exanthem      Your Follow-Up Providers       Schedule an appointment as soon as possible for a visit  with Francia Coombs APRN.    Specialty: Nurse Practitioner  [address] [address]  [phone]                       Contact information for after-discharge care    Follow-up information has not been specified.                    Your medication list        CHANGE how you take these medications        Instructions Last Dose Given Next Dose Due   topiramate 25 MG tablet  Commonly known as: Topamax  What changed: when to take this      Take 1 tablet by mouth 2 (Two) Times a Day.              CONTINUE taking these medications        Instructions Last Dose Given Next Dose Due   HYDROcodone-acetaminophen 5-325 MG per tablet  Commonly known as: NORCO      Take 1 tablet by mouth Every 4 (Four) Hours As Needed for Moderate Pain or Severe Pain.       Slynd 4 MG tablet  Generic drug: Drospirenone      Take  by mouth.

## 2025-02-09 NOTE — DISCHARGE INSTRUCTIONS
Follow-up with PCP for recheck of symptoms.  Take daily Zyrtec.  Have low threshold to return the emergency for new, worsening, concerning symptoms.

## 2025-02-11 LAB — BACTERIA SPEC AEROBE CULT: NORMAL

## 2025-07-09 ENCOUNTER — TRANSCRIBE ORDERS (OUTPATIENT)
Dept: ADMINISTRATIVE | Facility: HOSPITAL | Age: 35
End: 2025-07-09
Payer: COMMERCIAL

## 2025-07-09 DIAGNOSIS — N63.20 MASS OF LEFT BREAST, UNSPECIFIED QUADRANT: Primary | ICD-10-CM

## 2025-07-15 LAB
NCCN CRITERIA FLAG: NORMAL
TYRER CUZICK SCORE: 8.2

## 2025-07-16 ENCOUNTER — HOSPITAL ENCOUNTER (OUTPATIENT)
Dept: ULTRASOUND IMAGING | Facility: HOSPITAL | Age: 35
Discharge: HOME | End: 2025-07-16
Payer: COMMERCIAL

## 2025-07-16 ENCOUNTER — HOSPITAL ENCOUNTER (OUTPATIENT)
Dept: MAMMOGRAPHY | Facility: HOSPITAL | Age: 35
Discharge: HOME | End: 2025-07-16
Payer: COMMERCIAL

## 2025-07-16 DIAGNOSIS — N63.20 MASS OF LEFT BREAST, UNSPECIFIED QUADRANT: ICD-10-CM

## 2025-07-16 PROCEDURE — 77066 DX MAMMO INCL CAD BI: CPT | Performed by: RADIOLOGY

## 2025-07-16 PROCEDURE — 76642 ULTRASOUND BREAST LIMITED: CPT

## 2025-07-16 PROCEDURE — G0279 TOMOSYNTHESIS, MAMMO: HCPCS

## 2025-07-16 PROCEDURE — 77066 DX MAMMO INCL CAD BI: CPT

## 2025-07-16 PROCEDURE — 76642 ULTRASOUND BREAST LIMITED: CPT | Performed by: RADIOLOGY

## 2025-07-16 PROCEDURE — 77062 BREAST TOMOSYNTHESIS BI: CPT | Performed by: RADIOLOGY
